# Patient Record
Sex: FEMALE | Race: BLACK OR AFRICAN AMERICAN | NOT HISPANIC OR LATINO | Employment: OTHER | RURAL
[De-identification: names, ages, dates, MRNs, and addresses within clinical notes are randomized per-mention and may not be internally consistent; named-entity substitution may affect disease eponyms.]

---

## 2023-10-20 DIAGNOSIS — M06.9 RHEUMATOID ARTHRITIS: Primary | ICD-10-CM

## 2023-10-20 DIAGNOSIS — I89.0 CHRONIC ACQUIRED LYMPHEDEMA: ICD-10-CM

## 2024-02-06 ENCOUNTER — OFFICE VISIT (OUTPATIENT)
Dept: VASCULAR SURGERY | Facility: CLINIC | Age: 79
End: 2024-02-06
Payer: COMMERCIAL

## 2024-02-06 VITALS
BODY MASS INDEX: 36.68 KG/M2 | HEIGHT: 63 IN | WEIGHT: 207 LBS | HEART RATE: 80 BPM | DIASTOLIC BLOOD PRESSURE: 65 MMHG | SYSTOLIC BLOOD PRESSURE: 132 MMHG | RESPIRATION RATE: 16 BRPM

## 2024-02-06 DIAGNOSIS — M79.605 LEG PAIN, BILATERAL: Primary | ICD-10-CM

## 2024-02-06 DIAGNOSIS — M79.604 LEG PAIN, BILATERAL: Primary | ICD-10-CM

## 2024-02-06 DIAGNOSIS — L81.9 HYPERPIGMENTATION OF SKIN: ICD-10-CM

## 2024-02-06 DIAGNOSIS — L85.9 HYPERKERATOSIS: ICD-10-CM

## 2024-02-06 DIAGNOSIS — R60.0 EDEMA, LOWER EXTREMITY: ICD-10-CM

## 2024-02-06 PROCEDURE — 1160F RVW MEDS BY RX/DR IN RCRD: CPT | Mod: CPTII,,, | Performed by: FAMILY MEDICINE

## 2024-02-06 PROCEDURE — 1159F MED LIST DOCD IN RCRD: CPT | Mod: CPTII,,, | Performed by: FAMILY MEDICINE

## 2024-02-06 PROCEDURE — 3078F DIAST BP <80 MM HG: CPT | Mod: CPTII,,, | Performed by: FAMILY MEDICINE

## 2024-02-06 PROCEDURE — 99205 OFFICE O/P NEW HI 60 MIN: CPT | Mod: PBBFAC | Performed by: FAMILY MEDICINE

## 2024-02-06 PROCEDURE — 99203 OFFICE O/P NEW LOW 30 MIN: CPT | Mod: S$PBB,,, | Performed by: FAMILY MEDICINE

## 2024-02-06 PROCEDURE — 3075F SYST BP GE 130 - 139MM HG: CPT | Mod: CPTII,,, | Performed by: FAMILY MEDICINE

## 2024-02-06 RX ORDER — AMOXICILLIN 250 MG
1 CAPSULE ORAL DAILY
COMMUNITY

## 2024-02-06 RX ORDER — ALBUTEROL SULFATE 0.83 MG/ML
SOLUTION RESPIRATORY (INHALATION)
COMMUNITY
Start: 2024-01-04

## 2024-02-06 RX ORDER — LEVOFLOXACIN 500 MG/1
TABLET, FILM COATED ORAL
COMMUNITY
Start: 2024-01-29

## 2024-02-06 RX ORDER — TRIAMCINOLONE ACETONIDE 1 MG/G
CREAM TOPICAL 2 TIMES DAILY
COMMUNITY

## 2024-02-06 RX ORDER — BACLOFEN 10 MG/1
10 TABLET ORAL 3 TIMES DAILY
COMMUNITY
Start: 2023-10-02

## 2024-02-06 RX ORDER — MUPIROCIN 20 MG/G
OINTMENT TOPICAL 3 TIMES DAILY
COMMUNITY

## 2024-02-06 RX ORDER — LACTULOSE 10 G/15ML
SOLUTION ORAL; RECTAL
COMMUNITY
Start: 2023-12-12

## 2024-02-06 RX ORDER — AZELASTINE 1 MG/ML
SPRAY, METERED NASAL
COMMUNITY
Start: 2024-01-04

## 2024-02-06 RX ORDER — CEPHALEXIN 500 MG/1
CAPSULE ORAL
COMMUNITY
Start: 2024-01-03

## 2024-02-06 RX ORDER — PANTOPRAZOLE SODIUM 40 MG/1
TABLET, DELAYED RELEASE ORAL
COMMUNITY
Start: 2024-01-04

## 2024-02-06 RX ORDER — ONDANSETRON 4 MG/1
4 TABLET, ORALLY DISINTEGRATING ORAL ONCE
COMMUNITY

## 2024-02-06 RX ORDER — ZINC GLUCONATE 50 MG
50 TABLET ORAL DAILY
COMMUNITY

## 2024-02-06 RX ORDER — HYDROCODONE BITARTRATE AND ACETAMINOPHEN 10; 325 MG/1; MG/1
TABLET ORAL
COMMUNITY
Start: 2023-11-06

## 2024-02-06 RX ORDER — POTASSIUM CHLORIDE 750 MG/1
TABLET, EXTENDED RELEASE ORAL
COMMUNITY
Start: 2023-12-18

## 2024-02-06 RX ORDER — LORATADINE 10 MG/1
10 TABLET ORAL DAILY
COMMUNITY

## 2024-02-06 NOTE — PROGRESS NOTES
VEIN CENTER CLINIC NOTE    Patient ID: Ольга Hutchinson is a 79 y.o. female.    I. HISTORY     Chief Complaint:   Chief Complaint   Patient presents with    Leg Swelling     NP; REF BY KLEBER COLINDRES; LEG SWELLING        HPI: Ольга Hutchinson is a 79 y.o. female who is referred here today by Kleber Colindres FNP for evaluation of bilateral lower extremity edema, hyperpigmentation, hyperkeratosis and skin breakdown..  Symptoms are progressive/worsening and began greater than 20 years ago.  Location is bilateral lower extremities below the knees. Symptoms are worse at the end of the day.  History of venous interventions includes none.  Denies family history of venous disease.  Denies history of DVT.  Admits to history of cellulitis.    Venous Disease Medical Necessity Documentation Initial Visit Date:  02/06/2024 Return Check Date:    Have you ever had a rupture or bleed from a varicose vein in your leg(s)?              [] Yes  [x] No   [] Yes   [] No   Have you ever been diagnosed with phlebitis, cellulitis, or inflammation in the area of the varicose veins of  your leg(s)?  [] Yes  [x] No    [] Yes   [] No   Do you have darkened or inflamed skin on your legs?   [x] Yes   [] No   [] Yes   [] No   Do you have leg swelling?     [x] Yes   [] No   [] Yes   [] No   Do you have leg pain?   [x] Yes   [] No   [] Yes   [] No   If yes, describe the type of pain?    []   Stabbing []  Radiating [x]  Aching   []  Tightness []  Throbbing               []  Burning []  Cramping              Do you have leg discomfort?   [x] Yes   [] No   [] Yes   [] No   If yes, describe the type of discomfort?    [x]  Heaviness [x]  Fullness   []  Restlessness [] Tired/Fatigued [] Itching              Have you ever worn compression hose?   [] Yes   [x] No   [] Yes   [] No   If yes, how long?           Do you elevate your legs while sitting?   [x] Yes   [] No   [] Yes   [] No   Does venous disease (varicose veins, ulcers, skin changes, leg pain/swelling)  interfere with your daily life?  If yes, check activities you are limited or unable to do.    []  Shower  []   Walk  []  Exercise  [] Play with children/grandchildren  []  Shop [] Work [] Stand for any period of time [x] Sleep                               [] Sitting for an extended period of time.           [x] Yes   [] No   [] Yes   [] No   Do you exercise/have you tried to exercise (i.e.  Walk our participate in a regular exercise routine)?  [] Yes  [x] No   [] Yes   [] No   BMI 36.67             History reviewed. No pertinent past medical history.     History reviewed. No pertinent surgical history.    Social History     Tobacco Use   Smoking Status Never   Smokeless Tobacco Never         Current Outpatient Medications:     albuterol (PROVENTIL) 2.5 mg /3 mL (0.083 %) nebulizer solution, 3 MILLILITER VIAL NEBULIZED AS NEEDED EVERY 6 HOURS AS NEEDED WHEEZING INHALATION 90 DAYS, Disp: , Rfl:     azelastine (ASTELIN) 137 mcg (0.1 %) nasal spray, 1 PUFF IN EACH NOSTRIL NASALLY TWICE A DAY 90 DAYS, Disp: , Rfl:     baclofen (LIORESAL) 10 MG tablet, Take 10 mg by mouth 3 (three) times daily., Disp: , Rfl:     cephALEXin (KEFLEX) 500 MG capsule, TAKE 1 CAPSULE ORALLY THREE TIMES A DAY X 10 DAYS TAKE WITH A FULL GLASS OF WATER, Disp: , Rfl:     HYDROcodone-acetaminophen (NORCO)  mg per tablet, TAKE 1 ORAL TABLET 3 TIMES A DAY AS NEEDED FOR PAIN DO NOT DRIVE WHILE TAKING THIS MEDICATION TO BE FILLED ON OR AFTER 11-4-2023, Disp: , Rfl:     lactulose (CHRONULAC) 10 gram/15 mL solution, TAKE 10 ML ORALLY EVERY DAY AS NEEDED 90 DAYS, Disp: , Rfl:     levoFLOXacin (LEVAQUIN) 500 MG tablet, TAKE 1 TABLET ORALLY ONCE A DAY 14 DAYS TAKE WITH A FULL GLASS OF WATER, Disp: , Rfl:     loratadine (CLARITIN) 10 mg tablet, Take 10 mg by mouth once daily., Disp: , Rfl:     mupirocin (BACTROBAN) 2 % ointment, Apply topically 3 (three) times daily., Disp: , Rfl:     ondansetron (ZOFRAN-ODT) 4 MG TbDL, Take 4 mg by mouth once., Disp:  , Rfl:     pantoprazole (PROTONIX) 40 MG tablet, 1 TABLET FOR REFLUX ORALLY ONCE A DAY 90 DAYS, Disp: , Rfl:     potassium chloride SA (K-DUR,KLOR-CON M) 10 MEQ tablet, 1 TABLET WITH FOOD ORAL ONCE A DAY 90 DAYS, Disp: , Rfl:     senna-docusate 8.6-50 mg (PERICOLACE) 8.6-50 mg per tablet, Take 1 tablet by mouth once daily., Disp: , Rfl:     triamcinolone acetonide 0.1% (KENALOG) 0.1 % cream, Apply topically 2 (two) times daily., Disp: , Rfl:     zinc gluconate 50 mg tablet, Take 50 mg by mouth once daily., Disp: , Rfl:     Review of Systems   Constitutional:  Negative for activity change, chills, diaphoresis, fatigue and fever.   Respiratory:  Negative for cough and shortness of breath.    Cardiovascular:  Positive for leg swelling. Negative for chest pain and claudication.        Hyperpigmentation LE   Gastrointestinal:  Negative for nausea and vomiting.   Musculoskeletal:  Positive for leg pain. Negative for joint swelling.   Integumentary:  Negative for rash and wound.   Neurological:  Negative for weakness and numbness.          II. PHYSICAL EXAM     Physical Exam  Constitutional:       General: She is awake. She is not in acute distress.     Appearance: Normal appearance. She is obese. She is not ill-appearing or toxic-appearing.   HENT:      Head: Normocephalic and atraumatic.   Eyes:      Extraocular Movements: Extraocular movements intact.      Conjunctiva/sclera: Conjunctivae normal.      Pupils: Pupils are equal, round, and reactive to light.   Neck:      Vascular: No carotid bruit or JVD.   Cardiovascular:      Rate and Rhythm: Normal rate and regular rhythm.      Pulses:           Dorsalis pedis pulses are detected w/ Doppler on the left side.      Heart sounds: No murmur heard.  Pulmonary:      Effort: Pulmonary effort is normal. No respiratory distress.      Breath sounds: No stridor. No wheezing, rhonchi or rales.   Musculoskeletal:         General: No swelling, tenderness or deformity.      Right  lower leg: 3+ Pitting Edema present.      Left lower leg: 3+ Pitting Edema present.      Comments: Massive edema of the bilateral lower extremities with hyperkeratosis with plaques and some superficial skin breakdown.  No signs of active cellulitis noted today.   Feet:      Comments: Triphasic left dorsalis pedis pulse detected with hand-held Doppler.  Other pulses undetectable secondary to hyperkeratosis plaques.  Skin:     General: Skin is warm.      Capillary Refill: Capillary refill takes less than 2 seconds.      Coloration: Skin is not ashen.      Findings: No bruising, erythema, lesion, rash or wound.   Neurological:      Mental Status: She is alert and oriented to person, place, and time.      Motor: No weakness.   Psychiatric:         Speech: Speech normal.         Behavior: Behavior normal. Behavior is cooperative.         Reticular/Spider veins noted:  RLE: none  LLE: none    Varicose veins noted:  RLE: none  LLE:  none    CEAP Classification                   Venous Clinical Severity Score     III. ASSESSMENT & PLAN (MEDICAL DECISION MAKING)     1. Leg pain, bilateral    2. Hyperpigmentation of skin    3. Edema, lower extremity    4. Hyperkeratosis        Assessment/Diagnosis and Plan:  Patient has complaints, symptoms and physical exam findings of chronic venous disease.  Therefore, I will order a bilateral complete venous reflux study and see the patient back with results.    Today, we will wrap the patient with pink unna boot and Coban 2 Lite wraps bilaterally.  I have written home health orders for twice weekly wrap changes to this effect.  Patient will need to be wrap from the toes to the catch of the knee.    Orders Placed This Encounter    US Venous Reflux Study Bilateral          Shabbir León DO

## 2024-02-13 ENCOUNTER — HOSPITAL ENCOUNTER (OUTPATIENT)
Dept: RADIOLOGY | Facility: HOSPITAL | Age: 79
Discharge: HOME | End: 2024-02-13
Attending: FAMILY MEDICINE
Payer: COMMERCIAL

## 2024-02-13 ENCOUNTER — OFFICE VISIT (OUTPATIENT)
Dept: VASCULAR SURGERY | Facility: CLINIC | Age: 79
End: 2024-02-13
Payer: COMMERCIAL

## 2024-02-13 VITALS
HEART RATE: 80 BPM | HEIGHT: 63 IN | DIASTOLIC BLOOD PRESSURE: 68 MMHG | WEIGHT: 207 LBS | SYSTOLIC BLOOD PRESSURE: 118 MMHG | BODY MASS INDEX: 36.68 KG/M2 | RESPIRATION RATE: 16 BRPM

## 2024-02-13 DIAGNOSIS — M79.605 LEG PAIN, BILATERAL: ICD-10-CM

## 2024-02-13 DIAGNOSIS — M79.604 LEG PAIN, BILATERAL: ICD-10-CM

## 2024-02-13 DIAGNOSIS — L85.9 HYPERKERATOSIS: ICD-10-CM

## 2024-02-13 DIAGNOSIS — I87.2 VENOUS INSUFFICIENCY: Primary | ICD-10-CM

## 2024-02-13 DIAGNOSIS — R60.0 EDEMA, LOWER EXTREMITY: ICD-10-CM

## 2024-02-13 DIAGNOSIS — L81.9 HYPERPIGMENTATION OF SKIN: ICD-10-CM

## 2024-02-13 PROCEDURE — 3078F DIAST BP <80 MM HG: CPT | Mod: CPTII,,, | Performed by: FAMILY MEDICINE

## 2024-02-13 PROCEDURE — 99214 OFFICE O/P EST MOD 30 MIN: CPT | Mod: PBBFAC,25 | Performed by: FAMILY MEDICINE

## 2024-02-13 PROCEDURE — 93970 EXTREMITY STUDY: CPT | Mod: TC

## 2024-02-13 PROCEDURE — 1159F MED LIST DOCD IN RCRD: CPT | Mod: CPTII,,, | Performed by: FAMILY MEDICINE

## 2024-02-13 PROCEDURE — 1160F RVW MEDS BY RX/DR IN RCRD: CPT | Mod: CPTII,,, | Performed by: FAMILY MEDICINE

## 2024-02-13 PROCEDURE — 99214 OFFICE O/P EST MOD 30 MIN: CPT | Mod: S$PBB,,, | Performed by: FAMILY MEDICINE

## 2024-02-13 PROCEDURE — 93970 EXTREMITY STUDY: CPT | Mod: 26,,, | Performed by: FAMILY MEDICINE

## 2024-02-13 PROCEDURE — 3074F SYST BP LT 130 MM HG: CPT | Mod: CPTII,,, | Performed by: FAMILY MEDICINE

## 2024-02-13 NOTE — PROGRESS NOTES
VEIN CENTER CLINIC NOTE    Patient ID: Ольга Hutchinson is a 79 y.o. female.    I. HISTORY     Chief Complaint:   Chief Complaint   Patient presents with    Follow-up     US RESULTS BAILEY COMP        HPI: Ольга Hutchinson is a 79 y.o. female who presents for follow-up and results to a bilateral complete venous reflux study, today 02/13/2024, and the results were discussed with the patient.  This study shows no evidence of DVT bilaterally, however study limited to body habitus.  The study also shows dilation and axial reflux of the bilateral great saphenous veins.  No reflux noted in the bilateral small saphenous veins.    Patient was initially seen on 02/06/2024 by referral from Ank[facility] Colindres [facility] for evaluation of bilateral lower extremity edema, hyperpigmentation, hyperkeratosis and skin breakdown..  Symptoms are progressive/worsening and began greater than 20 years ago.  Location is bilateral lower extremities below the knees. Symptoms are worse at the end of the day.  History of venous interventions includes none.  Denies family history of venous disease.  Denies history of DVT.  Admits to history of cellulitis.    Venous Disease Medical Necessity Documentation Initial Visit Date:  02/06/2024 Return Check Date:    Have you ever had a rupture or bleed from a varicose vein in your leg(s)?              [] Yes  [x] No   [] Yes   [] No   Have you ever been diagnosed with phlebitis, cellulitis, or inflammation in the area of the varicose veins of  your leg(s)?  [] Yes  [x] No    [] Yes   [] No   Do you have darkened or inflamed skin on your legs?   [x] Yes   [] No   [] Yes   [] No   Do you have leg swelling?     [x] Yes   [] No   [] Yes   [] No   Do you have leg pain?   [x] Yes   [] No   [] Yes   [] No   If yes, describe the type of pain?    []   Stabbing []  Radiating [x]  Aching   []  Tightness []  Throbbing               []  Burning []  Cramping              Do you have leg discomfort?   [x] Yes   [] No   [] Yes   [] No   If  yes, describe the type of discomfort?    [x]  Heaviness [x]  Fullness   []  Restlessness [] Tired/Fatigued [] Itching              Have you ever worn compression hose?   [] Yes   [x] No   [] Yes   [] No   If yes, how long?           Do you elevate your legs while sitting?   [x] Yes   [] No   [] Yes   [] No   Does venous disease (varicose veins, ulcers, skin changes, leg pain/swelling) interfere with your daily life?  If yes, check activities you are limited or unable to do.    []  Shower  []   Walk  []  Exercise  [] Play with children/grandchildren  []  Shop [] Work [] Stand for any period of time [x] Sleep                               [] Sitting for an extended period of time.           [x] Yes   [] No   [] Yes   [] No   Do you exercise/have you tried to exercise (i.e.  Walk our participate in a regular exercise routine)?  [] Yes  [x] No   [] Yes   [] No   BMI 36.67             History reviewed. No pertinent past medical history.     History reviewed. No pertinent surgical history.    Social History     Tobacco Use   Smoking Status Never   Smokeless Tobacco Never         Current Outpatient Medications:     albuterol (PROVENTIL) 2.5 mg /3 mL (0.083 %) nebulizer solution, 3 MILLILITER VIAL NEBULIZED AS NEEDED EVERY 6 HOURS AS NEEDED WHEEZING INHALATION 90 DAYS, Disp: , Rfl:     azelastine (ASTELIN) 137 mcg (0.1 %) nasal spray, 1 PUFF IN EACH NOSTRIL NASALLY TWICE A DAY 90 DAYS, Disp: , Rfl:     baclofen (LIORESAL) 10 MG tablet, Take 10 mg by mouth 3 (three) times daily., Disp: , Rfl:     cephALEXin (KEFLEX) 500 MG capsule, TAKE 1 CAPSULE ORALLY THREE TIMES A DAY X 10 DAYS TAKE WITH A FULL GLASS OF WATER, Disp: , Rfl:     HYDROcodone-acetaminophen (NORCO)  mg per tablet, TAKE 1 ORAL TABLET 3 TIMES A DAY AS NEEDED FOR PAIN DO NOT DRIVE WHILE TAKING THIS MEDICATION TO BE FILLED ON OR AFTER 11-4-2023, Disp: , Rfl:     lactulose (CHRONULAC) 10 gram/15 mL solution, TAKE 10 ML ORALLY EVERY DAY AS NEEDED 90 DAYS,  Disp: , Rfl:     levoFLOXacin (LEVAQUIN) 500 MG tablet, TAKE 1 TABLET ORALLY ONCE A DAY 14 DAYS TAKE WITH A FULL GLASS OF WATER, Disp: , Rfl:     loratadine (CLARITIN) 10 mg tablet, Take 10 mg by mouth once daily., Disp: , Rfl:     mupirocin (BACTROBAN) 2 % ointment, Apply topically 3 (three) times daily., Disp: , Rfl:     ondansetron (ZOFRAN-ODT) 4 MG TbDL, Take 4 mg by mouth once., Disp: , Rfl:     pantoprazole (PROTONIX) 40 MG tablet, 1 TABLET FOR REFLUX ORALLY ONCE A DAY 90 DAYS, Disp: , Rfl:     potassium chloride SA (K-DUR,KLOR-CON M) 10 MEQ tablet, 1 TABLET WITH FOOD ORAL ONCE A DAY 90 DAYS, Disp: , Rfl:     senna-docusate 8.6-50 mg (PERICOLACE) 8.6-50 mg per tablet, Take 1 tablet by mouth once daily., Disp: , Rfl:     triamcinolone acetonide 0.1% (KENALOG) 0.1 % cream, Apply topically 2 (two) times daily., Disp: , Rfl:     zinc gluconate 50 mg tablet, Take 50 mg by mouth once daily., Disp: , Rfl:     Review of Systems   Constitutional:  Negative for activity change, chills, diaphoresis, fatigue and fever.   Respiratory:  Negative for cough and shortness of breath.    Cardiovascular:  Positive for leg swelling. Negative for chest pain and claudication.        Hyperpigmentation LE   Gastrointestinal:  Negative for nausea and vomiting.   Musculoskeletal:  Positive for leg pain. Negative for joint swelling.   Integumentary:  Negative for rash and wound.   Neurological:  Negative for weakness and numbness.          II. PHYSICAL EXAM     Physical Exam  Constitutional:       General: She is awake. She is not in acute distress.     Appearance: Normal appearance. She is obese. She is not ill-appearing or toxic-appearing.   HENT:      Head: Normocephalic and atraumatic.   Eyes:      Extraocular Movements: Extraocular movements intact.      Conjunctiva/sclera: Conjunctivae normal.      Pupils: Pupils are equal, round, and reactive to light.   Neck:      Vascular: No carotid bruit or JVD.   Cardiovascular:      Rate and  Rhythm: Normal rate and regular rhythm.      Pulses:           Dorsalis pedis pulses are detected w/ Doppler on the left side.      Heart sounds: No murmur heard.  Pulmonary:      Effort: Pulmonary effort is normal. No respiratory distress.      Breath sounds: No stridor. No wheezing, rhonchi or rales.   Musculoskeletal:         General: No swelling, tenderness or deformity.      Right lower leg: 3+ Pitting Edema present.      Left lower leg: 3+ Pitting Edema present.      Comments: Massive edema of the bilateral lower extremities with hyperkeratosis with plaques and some superficial skin breakdown.  No signs of active cellulitis noted today.   Feet:      Comments: Triphasic left dorsalis pedis pulse detected with hand-held Doppler.  Other pulses undetectable secondary to hyperkeratosis plaques.  Skin:     General: Skin is warm.      Capillary Refill: Capillary refill takes less than 2 seconds.      Coloration: Skin is not ashen.      Findings: No bruising, erythema, lesion, rash or wound.   Neurological:      Mental Status: She is alert and oriented to person, place, and time.      Motor: No weakness.   Psychiatric:         Speech: Speech normal.         Behavior: Behavior normal. Behavior is cooperative.         Reticular/Spider veins noted:  RLE: none  LLE: none    Varicose veins noted:  RLE: none  LLE:  none    CEAP Classification  Clinical Signs: Class 4 - Skin changes ascribed to venous disease  Etiologic Classification: Primary  Anatomic distribution: Superficial  Pathophysiologic dysfunction: Reflux                     Venous Clinical Severity Score  Pain:2=Daily, moderate activity limitation, occasional analgesics  Varicose Veins: 0=None  Venous Edema: 3=Morning edema above ankle and requiring activity change, elevation  Pigmentation: 3=Wider distribution (above lower 1/3) and recent pigmentation  Inflammation: 0=None  Induration: 3=Entire lower third of leg or more  Number of Active Ulcers: 2=2  Active  Ulceration, Duration: 2=>3 months, <1 year  Active Ulcer Size: 1=<2 cm diameter  Compressive Therapy: 3=Full compliance, stockings + elevation  Total Score: 19       III. ASSESSMENT & PLAN (MEDICAL DECISION MAKING)     1. Venous insufficiency    2. Hyperpigmentation of skin    3. Leg pain, bilateral    4. Edema, lower extremity    5. Hyperkeratosis        Assessment/Diagnosis and Plan:  Ultrasound of lower extremities reveals positive evidence of venous insufficiency in the bilateral great saphenous veins.  Plan for conservative medical treatment at this time. The patient may benefit from endovenous ablation in the future.     - continue with pink unna boot and Coban 2 Lite wraps with twice weekly wrap changes by home health.  - Therapeutic leg elevation  - Calf pumping exercises  - RTC 1 months for further evaluation.  Patient contemplating correctional procedures at this time.            Shabbir León, DO

## 2024-03-25 ENCOUNTER — OFFICE VISIT (OUTPATIENT)
Dept: VASCULAR SURGERY | Facility: CLINIC | Age: 79
End: 2024-03-25
Payer: COMMERCIAL

## 2024-03-25 VITALS
HEIGHT: 63 IN | DIASTOLIC BLOOD PRESSURE: 70 MMHG | HEART RATE: 88 BPM | BODY MASS INDEX: 36.68 KG/M2 | SYSTOLIC BLOOD PRESSURE: 134 MMHG | WEIGHT: 207 LBS | RESPIRATION RATE: 20 BRPM

## 2024-03-25 DIAGNOSIS — L85.9 HYPERKERATOSIS: ICD-10-CM

## 2024-03-25 DIAGNOSIS — I87.2 VENOUS INSUFFICIENCY: ICD-10-CM

## 2024-03-25 DIAGNOSIS — M79.605 LEG PAIN, BILATERAL: ICD-10-CM

## 2024-03-25 DIAGNOSIS — I87.2 VENOUS INSUFFICIENCY: Primary | ICD-10-CM

## 2024-03-25 DIAGNOSIS — R60.0 EDEMA, LOWER EXTREMITY: ICD-10-CM

## 2024-03-25 DIAGNOSIS — L81.9 HYPERPIGMENTATION OF SKIN: Primary | ICD-10-CM

## 2024-03-25 DIAGNOSIS — M79.604 LEG PAIN, BILATERAL: ICD-10-CM

## 2024-03-25 PROCEDURE — 99213 OFFICE O/P EST LOW 20 MIN: CPT | Mod: S$PBB,,, | Performed by: FAMILY MEDICINE

## 2024-03-25 PROCEDURE — 1159F MED LIST DOCD IN RCRD: CPT | Mod: CPTII,,, | Performed by: FAMILY MEDICINE

## 2024-03-25 PROCEDURE — 3075F SYST BP GE 130 - 139MM HG: CPT | Mod: CPTII,,, | Performed by: FAMILY MEDICINE

## 2024-03-25 PROCEDURE — 1160F RVW MEDS BY RX/DR IN RCRD: CPT | Mod: CPTII,,, | Performed by: FAMILY MEDICINE

## 2024-03-25 PROCEDURE — 3078F DIAST BP <80 MM HG: CPT | Mod: CPTII,,, | Performed by: FAMILY MEDICINE

## 2024-03-25 PROCEDURE — 99214 OFFICE O/P EST MOD 30 MIN: CPT | Mod: PBBFAC | Performed by: FAMILY MEDICINE

## 2024-03-25 NOTE — PATIENT INSTRUCTIONS
Pre Procedure Information    Procedure (s) and Date (s): 1. Right GSV V enaSeal Ablation 05/16/2024 @ 0830               2. Left GSV VenaSeal Ablation 05/23/2024 @ 0830    We will call you the day prior with your time to report for surgery.  You will check in at [facility].  Shower prior to procedure as your leg will be wrapped for 48 hours and you will not be able to shower.  Do not wear lotion, oil, or cream on you legs on the day of your procedure.  This will cause the Doctor's nickolas to fade.  Wear shorts, skirt, or loose pants and larger shoes (house shoes).   Bring a pillow or two and leave in the car (to prop your leg).  Prepare to walk 15 minutes out of each hour for the first 48 hours post op.  Prepare to keep your legs propped up while sitting for the first 48 hours or 2 days following your procedure (recliner, couch, or chair).  Dressings will remain on your legs for at least 48 hours or 2 days after procedure.  Full discharge instructions will be provided on the day of your procedure.    Typically, you are in and out within a few hours.  We will follow you postoperatively with a 4 day post ablation ultrasound and then a 1 month, 3 month, 6 month, and 1 year post ablation visit with the physician or nurse practitioner with or without an ultrasound.  Please make every effort to attend these appointments as they will be essential to following you progress.  Please kindly notify us as soon as possible if you need to reschedule your appointment.  As always, we look forward to caring for you and are happy to answer your questions.  Please call us at [phone].

## 2024-03-25 NOTE — PROGRESS NOTES
VEIN CENTER CLINIC NOTE    Patient ID: Оьлга Hutchinson is a 78 y.o. female.    I. HISTORY     Chief Complaint:   Chief Complaint   Patient presents with    Follow-up     Procedure room 1. 1M WRAP        HPI: Ольга Hutchinson is a 78 y.o. female who presents for follow-up  And wrap/ wound check.  The patient has been undergoing pink unna boot and Coban 2 Lite compression wraps with twice weekly wrap changes by home health.  Patient was noted to have superficial skin breakdown bilaterally along with hyperkeratosis and brawny edema.  The patient states that the wraps have helped to improve the condition of her lower extremities with less edema and pain.   However, she continues to have life altering symptoms as well as skin breakdown of the bilateral lower extremities and would like to have underlying condition corrected if possible.    Bilateral complete venous reflux study, 02/13/2024, shows no evidence of DVT bilaterally, however study limited to body habitus.  The study also shows dilation and axial reflux of the bilateral great saphenous veins.  No reflux noted in the bilateral small saphenous veins.    Patient was initially seen on 02/06/2024 by referral from [facility] for evaluation of bilateral lower extremity edema, hyperpigmentation, hyperkeratosis and skin breakdown..  Symptoms are progressive/worsening and began greater than 20 years ago.  Location is bilateral lower extremities below the knees. Symptoms are worse at the end of the day.  History of venous interventions includes none.  Denies family history of venous disease.  Denies history of DVT.  Admits to history of cellulitis.    Venous Disease Medical Necessity Documentation Initial Visit Date:  02/06/2024 Return Check Date:    Have you ever had a rupture or bleed from a varicose vein in your leg(s)?              [] Yes  [x] No   [] Yes   [] No   Have you ever been diagnosed with phlebitis, cellulitis, or inflammation in the area of the varicose veins  of  your leg(s)?  [] Yes  [x] No    [] Yes   [] No   Do you have darkened or inflamed skin on your legs?   [x] Yes   [] No   [] Yes   [] No   Do you have leg swelling?     [x] Yes   [] No   [] Yes   [] No   Do you have leg pain?   [x] Yes   [] No   [] Yes   [] No   If yes, describe the type of pain?    []   Stabbing []  Radiating [x]  Aching   []  Tightness []  Throbbing               []  Burning []  Cramping              Do you have leg discomfort?   [x] Yes   [] No   [] Yes   [] No   If yes, describe the type of discomfort?    [x]  Heaviness [x]  Fullness   []  Restlessness [] Tired/Fatigued [] Itching              Have you ever worn compression hose?   [] Yes   [x] No   [] Yes   [] No   If yes, how long?           Do you elevate your legs while sitting?   [x] Yes   [] No   [] Yes   [] No   Does venous disease (varicose veins, ulcers, skin changes, leg pain/swelling) interfere with your daily life?  If yes, check activities you are limited or unable to do.    []  Shower  []   Walk  []  Exercise  [] Play with children/grandchildren  []  Shop [] Work [] Stand for any period of time [x] Sleep                               [] Sitting for an extended period of time.           [x] Yes   [] No   [] Yes   [] No   Do you exercise/have you tried to exercise (i.e.  Walk our participate in a regular exercise routine)?  [] Yes  [x] No   [] Yes   [] No   BMI 36.67             History reviewed. No pertinent past medical history.     History reviewed. No pertinent surgical history.    Social History     Tobacco Use   Smoking Status Never   Smokeless Tobacco Never         Current Outpatient Medications:     albuterol (PROVENTIL) 2.5 mg /3 mL (0.083 %) nebulizer solution, 3 MILLILITER VIAL NEBULIZED AS NEEDED EVERY 6 HOURS AS NEEDED WHEEZING INHALATION 90 DAYS, Disp: , Rfl:     azelastine (ASTELIN) 137 mcg (0.1 %) nasal spray, 1 PUFF IN EACH NOSTRIL NASALLY TWICE A DAY 90 DAYS, Disp: , Rfl:     baclofen (LIORESAL) 10 MG tablet,  Take 10 mg by mouth 3 (three) times daily., Disp: , Rfl:     cephALEXin (KEFLEX) 500 MG capsule, TAKE 1 CAPSULE ORALLY THREE TIMES A DAY X 10 DAYS TAKE WITH A FULL GLASS OF WATER, Disp: , Rfl:     HYDROcodone-acetaminophen (NORCO)  mg per tablet, TAKE 1 ORAL TABLET 3 TIMES A DAY AS NEEDED FOR PAIN DO NOT DRIVE WHILE TAKING THIS MEDICATION TO BE FILLED ON OR AFTER 11-4-2023, Disp: , Rfl:     lactulose (CHRONULAC) 10 gram/15 mL solution, TAKE 10 ML ORALLY EVERY DAY AS NEEDED 90 DAYS, Disp: , Rfl:     levoFLOXacin (LEVAQUIN) 500 MG tablet, TAKE 1 TABLET ORALLY ONCE A DAY 14 DAYS TAKE WITH A FULL GLASS OF WATER, Disp: , Rfl:     loratadine (CLARITIN) 10 mg tablet, Take 10 mg by mouth once daily., Disp: , Rfl:     mupirocin (BACTROBAN) 2 % ointment, Apply topically 3 (three) times daily., Disp: , Rfl:     ondansetron (ZOFRAN-ODT) 4 MG TbDL, Take 4 mg by mouth once., Disp: , Rfl:     pantoprazole (PROTONIX) 40 MG tablet, 1 TABLET FOR REFLUX ORALLY ONCE A DAY 90 DAYS, Disp: , Rfl:     potassium chloride SA (K-DUR,KLOR-CON M) 10 MEQ tablet, 1 TABLET WITH FOOD ORAL ONCE A DAY 90 DAYS, Disp: , Rfl:     senna-docusate 8.6-50 mg (PERICOLACE) 8.6-50 mg per tablet, Take 1 tablet by mouth once daily., Disp: , Rfl:     triamcinolone acetonide 0.1% (KENALOG) 0.1 % cream, Apply topically 2 (two) times daily., Disp: , Rfl:     zinc gluconate 50 mg tablet, Take 50 mg by mouth once daily., Disp: , Rfl:     Review of Systems   Constitutional:  Negative for activity change, chills, diaphoresis, fatigue and fever.   Respiratory:  Negative for cough and shortness of breath.    Cardiovascular:  Positive for leg swelling. Negative for chest pain and claudication.        Hyperpigmentation LE   Gastrointestinal:  Negative for nausea and vomiting.   Musculoskeletal:  Positive for leg pain. Negative for joint swelling.   Integumentary:  Negative for rash and wound.   Neurological:  Negative for weakness and numbness.          II. PHYSICAL  EXAM     Physical Exam  Constitutional:       General: She is awake. She is not in acute distress.     Appearance: Normal appearance. She is obese. She is not ill-appearing or toxic-appearing.   HENT:      Head: Normocephalic and atraumatic.   Eyes:      Extraocular Movements: Extraocular movements intact.      Conjunctiva/sclera: Conjunctivae normal.      Pupils: Pupils are equal, round, and reactive to light.   Neck:      Vascular: No carotid bruit or JVD.   Cardiovascular:      Rate and Rhythm: Normal rate and regular rhythm.      Pulses:           Dorsalis pedis pulses are detected w/ Doppler on the left side.      Heart sounds: No murmur heard.  Pulmonary:      Effort: Pulmonary effort is normal. No respiratory distress.      Breath sounds: No stridor. No wheezing, rhonchi or rales.   Musculoskeletal:         General: No swelling, tenderness or deformity.      Right lower leg: 3+ Pitting Edema present.      Left lower leg: 3+ Pitting Edema present.      Comments: Massive edema of the bilateral lower extremities with hyperkeratosis with plaques and some superficial skin breakdown.  No signs of active cellulitis noted today.   Feet:      Comments: Triphasic left dorsalis pedis pulse detected with hand-held Doppler.  Other pulses undetectable secondary to hyperkeratosis plaques.  Skin:     General: Skin is warm.      Capillary Refill: Capillary refill takes less than 2 seconds.      Coloration: Skin is not ashen.      Findings: No bruising, erythema, lesion, rash or wound.   Neurological:      Mental Status: She is alert and oriented to person, place, and time.      Motor: No weakness.   Psychiatric:         Speech: Speech normal.         Behavior: Behavior normal. Behavior is cooperative.         Reticular/Spider veins noted:  RLE: none  LLE: none    Varicose veins noted:  RLE: none  LLE:  none    CEAP Classification  Clinical Signs: Class 6 - Leg ulceration, skin changes as defined above  Etiologic  Classification: Primary  Anatomic distribution: Superficial  Pathophysiologic dysfunction: Reflux                     Venous Clinical Severity Score  Pain:2=Daily, moderate activity limitation, occasional analgesics  Varicose Veins: 0=None  Venous Edema: 3=Morning edema above ankle and requiring activity change, elevation  Pigmentation: 3=Wider distribution (above lower 1/3) and recent pigmentation  Inflammation: 0=None  Induration: 3=Entire lower third of leg or more  Number of Active Ulcers: 2=2  Active Ulceration, Duration: 2=>3 months, <1 year  Active Ulcer Size: 1=<2 cm diameter  Compressive Therapy: 3=Full compliance, stockings + elevation  Total Score: 19       III. ASSESSMENT & PLAN (MEDICAL DECISION MAKING)     1. Hyperpigmentation of skin    2. Venous insufficiency    3. Leg pain, bilateral    4. Hyperkeratosis    5. Edema, lower extremity        Assessment/Diagnosis and Plan:    - continue with pink unna boot and Coban 2 Lite wraps with twice weekly wrap changes by home health.  - Therapeutic leg elevation  - Calf pumping exercises    - The patient continues to have sequela of chronic venous insufficiency despite over 3 months of conservative therapy. The patient continues to have life altering symptoms as well as a positive reflux study as noted in the history of present illness above. At this time I believe it would be reasonable to proceed with a bilateral great saphenous vein  non thermal adhesive endovenous ablation for symptomatic venous insufficiency.  Untreated venous disease increases the patient's risk for cellulitis, deep vein thrombosis, chronic skin changes and venous ulceration.  Risk and benefits of the procedure were explained to the patient and the patient wishes to proceed. The patient does not have overly distended veins and denies history of DVT/PE and will not require prophylactic anticoagulation.          Shabbir León,

## 2024-05-23 ENCOUNTER — PROCEDURE VISIT (OUTPATIENT)
Dept: VASCULAR SURGERY | Facility: CLINIC | Age: 79
End: 2024-05-23
Payer: COMMERCIAL

## 2024-05-23 VITALS
WEIGHT: 207 LBS | HEART RATE: 75 BPM | HEIGHT: 65 IN | DIASTOLIC BLOOD PRESSURE: 94 MMHG | RESPIRATION RATE: 16 BRPM | SYSTOLIC BLOOD PRESSURE: 144 MMHG | BODY MASS INDEX: 34.49 KG/M2

## 2024-05-23 DIAGNOSIS — L81.9 HYPERPIGMENTATION OF SKIN: ICD-10-CM

## 2024-05-23 DIAGNOSIS — I87.2 VENOUS INSUFFICIENCY: Primary | ICD-10-CM

## 2024-05-23 DIAGNOSIS — M79.605 LEG PAIN, BILATERAL: ICD-10-CM

## 2024-05-23 DIAGNOSIS — R60.0 EDEMA, LOWER EXTREMITY: ICD-10-CM

## 2024-05-23 DIAGNOSIS — L85.9 HYPERKERATOSIS: ICD-10-CM

## 2024-05-23 DIAGNOSIS — M79.604 LEG PAIN, BILATERAL: ICD-10-CM

## 2024-05-23 PROCEDURE — 99999PBSHW PR PBB SHADOW TECHNICAL ONLY FILED TO HB: Mod: PBBFAC,,,

## 2024-05-23 PROCEDURE — 36482 ENDOVEN THER CHEM ADHES 1ST: CPT | Mod: S$PBB,RT,, | Performed by: FAMILY MEDICINE

## 2024-05-23 PROCEDURE — 36482 ENDOVEN THER CHEM ADHES 1ST: CPT | Mod: PBBFAC | Performed by: FAMILY MEDICINE

## 2024-05-23 PROCEDURE — C1888 ENDOVAS NON-CARDIAC ABL CATH: HCPCS

## 2024-05-23 PROCEDURE — 27000272 HC BANDAGE KERLIX

## 2024-05-23 PROCEDURE — 27000932 HC BANDAGE ELAST SELF CLOSURE

## 2024-05-23 RX ORDER — LIDOCAINE HYDROCHLORIDE 10 MG/ML
1.5 INJECTION INFILTRATION; PERINEURAL ONCE
Status: COMPLETED | OUTPATIENT
Start: 2024-05-23 | End: 2024-05-23

## 2024-05-23 RX ADMIN — LIDOCAINE HYDROCHLORIDE 1.5 ML: 10 INJECTION, SOLUTION INFILTRATION; PERINEURAL at 08:05

## 2024-05-23 NOTE — PATIENT INSTRUCTIONS
Vein Procedure Discharge Instructions and appointment: Thursday 05/30/2024 @ 8:30 a.m.    For the first 48 hours (2 days) following your procedure:  Walk 15 minutes on every hour (while awake). Continue calf exercises and ankle flexing.  Keep leg propped up (recliner, couch, etc.) while sitting.  Take pain medication, if prescribed.  Take Ibuprofen, or Acetaminophen, for the next 2-3 days with an over-the-counter anti-acid (Prilosec, Protonix, Nexium, etc.)    After your 48 hours (2 days) Post Op period complete:  Remove dressings, throw away all except the ACE wrap, keep the ACE wrap. To Be done by Home Health  Shower using warm soapy water.  Use separate wash cloth on the treated leg.  No baths for 2 weeks.  Compression up to groin must be worn daily for 2 weeks.  You may use the ACE wrap for entire leg, or compression hose to knees and ACE wrap to groin.       Activity:  If you have a normal ultrasound one week after your ablation:  You may resume walking activities immediately.  You may resume jogging 2 weeks after your procedure.  You may resume swimming 2 weeks after your procedure.  If you had microphlebectomies, you must make sure all areas are completely healed before swimming.  You may resume lower body weight lifting 2 weeks after your procedure.  You may resume upper body weight lifting while sitting down 2 days after your procedure.  You may resume sexual activities 2 weeks after your procedure.  You may fly commercially 2 weeks after your procedure.  You may scuba dive 1 month after your procedure.    Pilots - You may not fly for 1 month after your procedure.  You must have a normal 1 month post op ultrasound before returning to flight status.     Pre Procedure Information    Procedure (s) and Date (s): 1. LT GSV VenaSeal Ablation 05/30/2024 @ 8:30 a.m.      We will call you the day prior with your time to report for surgery.  You will check in at [facility].  Shower prior to procedure as  your leg will be wrapped for 48 hours and you will not be able to shower.  Do not wear lotion, oil, or cream on you legs on the day of your procedure.  This will cause the Doctor's nickolas to fade.  Wear shorts, skirt, or loose pants and larger shoes (house shoes).   Bring a pillow or two and leave in the car (to prop your leg).  Prepare to walk 15 minutes out of each hour for the first 48 hours post op.  Prepare to keep your legs propped up while sitting for the first 48 hours or 2 days following your procedure (recliner, couch, or chair).  Dressings will remain on your legs for at least 48 hours or 2 days after procedure.  Full discharge instructions will be provided on the day of your procedure.    Typically, you are in and out within a few hours.  We will follow you postoperatively with a 4 day post ablation ultrasound and then a 1 month, 3 month, 6 month, and 1 year post ablation visit with the physician or nurse practitioner with or without an ultrasound.  Please make every effort to attend these appointments as they will be essential to following you progress.  Please kindly notify us as soon as possible if you need to reschedule your appointment.  As always, we look forward to caring for you and are happy to answer your questions.  Please call us at [phone].

## 2024-05-23 NOTE — PROCEDURES
Date: 5/23/24   Surgeon: Dr Corbin León DO    Procedure: Endovenous Adhesive Ablation of the   Right Greater Saphenous Vein     Estimated blood loss: 3 cc.  Specimens removed:  None.  Complications:  None.  Implants or grafts:  None.    Findings: Treatment Length  53 (cm):  maximum diameter of the vein treated 7.0 mm with a maximum reflux time of 3.0 seconds.    Pre-Procedure: Patient's vital signs and informed consent were obtained. Patient history was checked and updated with any changes since the last visit. The patient continues to present with symptoms of venous disease as proven via DUS Venous Insufficiency exam completed prior to procedure. A complete Time Out was performed; with all parties present, which verified patient's identification, intended procedure, correct procedure site, and all equipment/supplies needed to complete the procedure.     Procedure: Ultrasound guidance was used to nickolas the target vein with the patient positioned on the treatment table. The entire lower extremity was prepped with a 50/50 % solution of isopropyl alcohol and chlorhexidine; then sterile drapes were applied. Once the desired access point was identified; less than 5mL of 0.5% Lidocaine buffered with Sodium Bicarbonate was distributed, to comfortably gain access in real-time with ultrasound guidance. A guidewire was used as necessary, which allowed insertion of the blue introduction catheter. Once positioned, the delivery catheter was prepped and inserted into the introducer sheath. The delivery catheter position was confirmed via ultrasound 5cm distal to Saphenofemoral junction. Following the IFU, adhesive was delivered, segmentally, into the target vein. Ultrasound visualization confirmed successful treatment of the targeted vein with no evidence of complications at the junction. All devices were then removed, and hemostasis was achieved with a suture. Dressings with compression were applied to the access site and to  any puncture sites requiring them. There was minimal blood loss.

## 2024-05-24 DIAGNOSIS — I87.2 VENOUS INSUFFICIENCY: Primary | ICD-10-CM

## 2024-05-30 ENCOUNTER — HOSPITAL ENCOUNTER (OUTPATIENT)
Dept: RADIOLOGY | Facility: HOSPITAL | Age: 79
Discharge: HOME | End: 2024-05-30
Attending: FAMILY MEDICINE
Payer: COMMERCIAL

## 2024-05-30 ENCOUNTER — PROCEDURE VISIT (OUTPATIENT)
Dept: VASCULAR SURGERY | Facility: CLINIC | Age: 79
End: 2024-05-30
Payer: COMMERCIAL

## 2024-05-30 VITALS
RESPIRATION RATE: 18 BRPM | WEIGHT: 207 LBS | DIASTOLIC BLOOD PRESSURE: 82 MMHG | HEIGHT: 65 IN | BODY MASS INDEX: 34.49 KG/M2 | SYSTOLIC BLOOD PRESSURE: 142 MMHG | HEART RATE: 96 BPM

## 2024-05-30 DIAGNOSIS — I87.2 VENOUS INSUFFICIENCY: ICD-10-CM

## 2024-05-30 DIAGNOSIS — M79.604 LEG PAIN, BILATERAL: ICD-10-CM

## 2024-05-30 DIAGNOSIS — L85.9 HYPERKERATOSIS: ICD-10-CM

## 2024-05-30 DIAGNOSIS — L81.9 HYPERPIGMENTATION OF SKIN: ICD-10-CM

## 2024-05-30 DIAGNOSIS — M79.605 LEG PAIN, BILATERAL: ICD-10-CM

## 2024-05-30 DIAGNOSIS — I87.2 VENOUS INSUFFICIENCY: Primary | ICD-10-CM

## 2024-05-30 PROCEDURE — 99999PBSHW PR PBB SHADOW TECHNICAL ONLY FILED TO HB: Mod: PBBFAC,,,

## 2024-05-30 PROCEDURE — 27000272 HC BANDAGE KERLIX

## 2024-05-30 PROCEDURE — 93971 EXTREMITY STUDY: CPT | Mod: TC,59

## 2024-05-30 PROCEDURE — 36482 ENDOVEN THER CHEM ADHES 1ST: CPT | Mod: S$PBB,LT,, | Performed by: FAMILY MEDICINE

## 2024-05-30 PROCEDURE — 36482 ENDOVEN THER CHEM ADHES 1ST: CPT | Mod: PBBFAC | Performed by: FAMILY MEDICINE

## 2024-05-30 PROCEDURE — 93971 EXTREMITY STUDY: CPT | Mod: 26,,, | Performed by: FAMILY MEDICINE

## 2024-05-30 PROCEDURE — C1888 ENDOVAS NON-CARDIAC ABL CATH: HCPCS

## 2024-05-30 PROCEDURE — 27000932 HC BANDAGE ELAST SELF CLOSURE

## 2024-05-30 RX ORDER — LIDOCAINE HYDROCHLORIDE 10 MG/ML
2 INJECTION INFILTRATION; PERINEURAL
Status: COMPLETED | OUTPATIENT
Start: 2024-05-30 | End: 2024-05-30

## 2024-05-30 RX ORDER — CEFDINIR 300 MG/1
CAPSULE ORAL
COMMUNITY
Start: 2024-03-26

## 2024-05-30 RX ADMIN — LIDOCAINE HYDROCHLORIDE 2 ML: 10 INJECTION, SOLUTION INFILTRATION; PERINEURAL at 09:05

## 2024-05-30 NOTE — PROCEDURES
Date: 5/30/24   Surgeon: Dr Corbin León DO    Procedure: Endovenous Adhesive Ablation of the left Greater Saphenous Vein     Estimated blood loss: 3 cc.  Specimens removed:  None.  Complications:  None.  Implants or grafts:  None.    Findings: Treatment Length  58 (cm):  Maximum diameter of the vein treated 5.5 mm with a maximum reflux time 1.3 seconds.    Pre-Procedure: Patient's vital signs and informed consent were obtained. Patient history was checked and updated with any changes since the last visit. The patient continues to present with symptoms of venous disease as proven via DUS Venous Insufficiency exam completed prior to procedure. A complete Time Out was performed; with all parties present, which verified patient's identification, intended procedure, correct procedure site, and all equipment/supplies needed to complete the procedure.     Procedure: Ultrasound guidance was used to nickolas the target vein with the patient positioned on the treatment table. The entire lower extremity was prepped with a 50/50 % solution of isopropyl alcohol and chlorhexidine; then sterile drapes were applied. Once the desired access point was identified; less than 5mL of 0.5% Lidocaine buffered with Sodium Bicarbonate was distributed, to comfortably gain access in real-time with ultrasound guidance. A guidewire was used as necessary, which allowed insertion of the blue introduction catheter. Once positioned, the delivery catheter was prepped and inserted into the introducer sheath. The delivery catheter position was confirmed via ultrasound 5cm distal to Saphenofemoral junction. Following the IFU, adhesive was delivered, segmentally, into the target vein. Ultrasound visualization confirmed successful treatment of the targeted vein with no evidence of complications at the junction. All devices were then removed, and hemostasis was achieved with a suture. Dressings with compression were applied to the access site and to any  puncture sites requiring them. There was minimal blood loss.

## 2024-05-30 NOTE — PATIENT INSTRUCTIONS
Vein Procedure Discharge Instructions    For the first 48 hours (2 days) following your procedure:  Walk 15 minutes on every hour (while awake).  Keep leg propped up (recliner, couch, etc.) while sitting.  Take pain medication, if prescribed.  Take Ibuprofen, or Acetaminophen, for the next 2-3 days with an over-the-counter anti-acid (Prilosec, Protonix, Nexium, etc.)    After your 48 hours (2 days) Post Op period complete:  Remove dressings, throw away all except the ACE wrap, keep the ACE wrap.  Shower using warm soapy water.  Use separate wash cloth on the treated leg.  No baths for 2 weeks.  Compression up to groin must be worn daily for 2 weeks.  You may use the ACE wrap for entire leg, or compression hose to knees and ACE wrap to groin.  (If you had your small saphenous vein ablated, then compression only to knee)  If your vein was ablated with Varithena (foam), compression must be worn day and night.  Walk for at least 10 minutes daily for the next month.    Activity:  If you have a normal ultrasound one week after your ablation:  You may resume walking activities immediately.  You may resume jogging 2 weeks after your procedure.  You may resume swimming 2 weeks after your procedure.  If you had microphlebectomies, you must make sure all areas are completely healed before swimming.  You may resume lower body weight lifting 2 weeks after your procedure.  You may resume upper body weight lifting while sitting down 2 days after your procedure.  You may resume sexual activities 2 weeks after your procedure.  You may fly commercially 2 weeks after your procedure.  You may scuba dive 1 month after your procedure.    Pilots - You may not fly for 1 month after your procedure.  You must have a normal 1 month post op ultrasound before returning to flight status.

## 2024-06-03 ENCOUNTER — OFFICE VISIT (OUTPATIENT)
Dept: VASCULAR SURGERY | Facility: CLINIC | Age: 79
End: 2024-06-03
Payer: COMMERCIAL

## 2024-06-03 ENCOUNTER — HOSPITAL ENCOUNTER (OUTPATIENT)
Dept: RADIOLOGY | Facility: HOSPITAL | Age: 79
Discharge: HOME | End: 2024-06-03
Attending: FAMILY MEDICINE
Payer: COMMERCIAL

## 2024-06-03 VITALS
RESPIRATION RATE: 16 BRPM | HEART RATE: 70 BPM | HEIGHT: 65 IN | SYSTOLIC BLOOD PRESSURE: 195 MMHG | WEIGHT: 207 LBS | DIASTOLIC BLOOD PRESSURE: 95 MMHG | BODY MASS INDEX: 34.49 KG/M2

## 2024-06-03 DIAGNOSIS — M79.604 LEG PAIN, BILATERAL: ICD-10-CM

## 2024-06-03 DIAGNOSIS — M79.605 LEG PAIN, BILATERAL: ICD-10-CM

## 2024-06-03 DIAGNOSIS — L85.9 HYPERKERATOSIS: ICD-10-CM

## 2024-06-03 DIAGNOSIS — I87.2 VENOUS INSUFFICIENCY: ICD-10-CM

## 2024-06-03 DIAGNOSIS — I87.2 VENOUS INSUFFICIENCY: Primary | ICD-10-CM

## 2024-06-03 DIAGNOSIS — R60.0 EDEMA, LOWER EXTREMITY: ICD-10-CM

## 2024-06-03 PROCEDURE — 3077F SYST BP >= 140 MM HG: CPT | Mod: CPTII,,, | Performed by: FAMILY MEDICINE

## 2024-06-03 PROCEDURE — 3080F DIAST BP >= 90 MM HG: CPT | Mod: CPTII,,, | Performed by: FAMILY MEDICINE

## 2024-06-03 PROCEDURE — 93971 EXTREMITY STUDY: CPT | Mod: 26,,, | Performed by: FAMILY MEDICINE

## 2024-06-03 PROCEDURE — 99214 OFFICE O/P EST MOD 30 MIN: CPT | Mod: S$PBB,,, | Performed by: FAMILY MEDICINE

## 2024-06-03 PROCEDURE — 1160F RVW MEDS BY RX/DR IN RCRD: CPT | Mod: CPTII,,, | Performed by: FAMILY MEDICINE

## 2024-06-03 PROCEDURE — 1159F MED LIST DOCD IN RCRD: CPT | Mod: CPTII,,, | Performed by: FAMILY MEDICINE

## 2024-06-03 PROCEDURE — 99215 OFFICE O/P EST HI 40 MIN: CPT | Mod: PBBFAC,25 | Performed by: FAMILY MEDICINE

## 2024-06-03 PROCEDURE — 93971 EXTREMITY STUDY: CPT | Mod: TC

## 2024-06-04 NOTE — PROGRESS NOTES
VEIN CENTER CLINIC NOTE    Patient ID: Ольга Hutchinson is a 78 y.o. female.    I. HISTORY     Chief Complaint:   Chief Complaint   Patient presents with    Follow-up     4 D LT GSV VENASEAL        HPI: Ольга Hutchinson is a 78 y.o. female who presents for   A 4 day follow-up after undergoing a left great saphenous vein non thermal chemical ablation.  Patient states she did well over the weekend without complications.  States that both of her legs have started to feel lighter and less tight.  She continues to have edema and hyperkeratosis bilaterally.  She has been treated with pink unna boot and Coban 2 Lite wraps with twice weekly wrap changes by home health.  Postop ultrasound shows no evidence of chemical induced thrombus bilaterally and well ablated great saphenous veins bilaterally.  Overall she is doing well.      Clinical summary:  Bilateral complete venous reflux study, 02/13/2024, shows no evidence of DVT bilaterally, however study limited to body habitus.  The study also shows dilation and axial reflux of the bilateral great saphenous veins.  No reflux noted in the bilateral small saphenous veins.    Patient was initially seen on 02/06/2024 by referral from [facility] for evaluation of bilateral lower extremity edema, hyperpigmentation, hyperkeratosis and skin breakdown..  Symptoms are progressive/worsening and began greater than 20 years ago.  Location is bilateral lower extremities below the knees. Symptoms are worse at the end of the day.  History of venous interventions includes none.  Denies family history of venous disease.  Denies history of DVT.  Admits to history of cellulitis.    Venous Disease Medical Necessity Documentation Initial Visit Date:  02/06/2024 Return Check Date:    Have you ever had a rupture or bleed from a varicose vein in your leg(s)?              [] Yes  [x] No   [] Yes   [] No   Have you ever been diagnosed with phlebitis, cellulitis, or inflammation in the area of the varicose  veins of  your leg(s)?  [] Yes  [x] No    [] Yes   [] No   Do you have darkened or inflamed skin on your legs?   [x] Yes   [] No   [] Yes   [] No   Do you have leg swelling?     [x] Yes   [] No   [] Yes   [] No   Do you have leg pain?   [x] Yes   [] No   [] Yes   [] No   If yes, describe the type of pain?    []   Stabbing []  Radiating [x]  Aching   []  Tightness []  Throbbing               []  Burning []  Cramping              Do you have leg discomfort?   [x] Yes   [] No   [] Yes   [] No   If yes, describe the type of discomfort?    [x]  Heaviness [x]  Fullness   []  Restlessness [] Tired/Fatigued [] Itching              Have you ever worn compression hose?   [] Yes   [x] No   [] Yes   [] No   If yes, how long?           Do you elevate your legs while sitting?   [x] Yes   [] No   [] Yes   [] No   Does venous disease (varicose veins, ulcers, skin changes, leg pain/swelling) interfere with your daily life?  If yes, check activities you are limited or unable to do.    []  Shower  []   Walk  []  Exercise  [] Play with children/grandchildren  []  Shop [] Work [] Stand for any period of time [x] Sleep                               [] Sitting for an extended period of time.           [x] Yes   [] No   [] Yes   [] No   Do you exercise/have you tried to exercise (i.e.  Walk our participate in a regular exercise routine)?  [] Yes  [x] No   [] Yes   [] No   BMI 36.67             History reviewed. No pertinent past medical history.     Past Surgical History:   Procedure Laterality Date    ABLATION, CHEMICAL SEALANT, VARICOSE VEIN Right 05/23/2024    Right GSV Venaseal Ablation performed by Dr. Corbin León    ABLATION, CHEMICAL SEALANT, VARICOSE VEIN Left 05/30/2024    Left GSV Venaseal Ablation performed by Dr. Corbin León       Social History     Tobacco Use   Smoking Status Never   Smokeless Tobacco Never         Current Outpatient Medications:     albuterol (PROVENTIL) 2.5 mg /3 mL (0.083 %) nebulizer solution, 3  MILLILITER VIAL NEBULIZED AS NEEDED EVERY 6 HOURS AS NEEDED WHEEZING INHALATION 90 DAYS, Disp: , Rfl:     azelastine (ASTELIN) 137 mcg (0.1 %) nasal spray, 1 PUFF IN EACH NOSTRIL NASALLY TWICE A DAY 90 DAYS, Disp: , Rfl:     baclofen (LIORESAL) 10 MG tablet, Take 10 mg by mouth 3 (three) times daily., Disp: , Rfl:     cefdinir (OMNICEF) 300 MG capsule, take one with meals Orally twice a day for 10 days, Disp: , Rfl:     cephALEXin (KEFLEX) 500 MG capsule, TAKE 1 CAPSULE ORALLY THREE TIMES A DAY X 10 DAYS TAKE WITH A FULL GLASS OF WATER, Disp: , Rfl:     HYDROcodone-acetaminophen (NORCO)  mg per tablet, TAKE 1 ORAL TABLET 3 TIMES A DAY AS NEEDED FOR PAIN DO NOT DRIVE WHILE TAKING THIS MEDICATION TO BE FILLED ON OR AFTER 11-4-2023, Disp: , Rfl:     lactulose (CHRONULAC) 10 gram/15 mL solution, TAKE 10 ML ORALLY EVERY DAY AS NEEDED 90 DAYS, Disp: , Rfl:     levoFLOXacin (LEVAQUIN) 500 MG tablet, TAKE 1 TABLET ORALLY ONCE A DAY 14 DAYS TAKE WITH A FULL GLASS OF WATER, Disp: , Rfl:     loratadine (CLARITIN) 10 mg tablet, Take 10 mg by mouth once daily., Disp: , Rfl:     mupirocin (BACTROBAN) 2 % ointment, Apply topically 3 (three) times daily., Disp: , Rfl:     ondansetron (ZOFRAN-ODT) 4 MG TbDL, Take 4 mg by mouth once., Disp: , Rfl:     pantoprazole (PROTONIX) 40 MG tablet, 1 TABLET FOR REFLUX ORALLY ONCE A DAY 90 DAYS, Disp: , Rfl:     potassium chloride SA (K-DUR,KLOR-CON M) 10 MEQ tablet, 1 TABLET WITH FOOD ORAL ONCE A DAY 90 DAYS, Disp: , Rfl:     senna-docusate 8.6-50 mg (PERICOLACE) 8.6-50 mg per tablet, Take 1 tablet by mouth once daily., Disp: , Rfl:     triamcinolone acetonide 0.1% (KENALOG) 0.1 % cream, Apply topically 2 (two) times daily., Disp: , Rfl:     zinc gluconate 50 mg tablet, Take 50 mg by mouth once daily., Disp: , Rfl:     Review of Systems   Constitutional:  Negative for activity change, chills, diaphoresis, fatigue and fever.   Respiratory:  Negative for cough and shortness of breath.     Cardiovascular:  Positive for leg swelling. Negative for chest pain and claudication.        Hyperpigmentation LE   Gastrointestinal:  Negative for nausea and vomiting.   Musculoskeletal:  Positive for leg pain. Negative for joint swelling.   Integumentary:  Negative for rash and wound.   Neurological:  Negative for weakness and numbness.          II. PHYSICAL EXAM     Physical Exam  Constitutional:       General: She is awake. She is not in acute distress.     Appearance: Normal appearance. She is obese. She is not ill-appearing or toxic-appearing.   HENT:      Head: Normocephalic and atraumatic.   Eyes:      Extraocular Movements: Extraocular movements intact.      Conjunctiva/sclera: Conjunctivae normal.      Pupils: Pupils are equal, round, and reactive to light.   Neck:      Vascular: No carotid bruit or JVD.   Cardiovascular:      Rate and Rhythm: Normal rate and regular rhythm.      Pulses:           Dorsalis pedis pulses are detected w/ Doppler on the left side.      Heart sounds: No murmur heard.  Pulmonary:      Effort: Pulmonary effort is normal. No respiratory distress.      Breath sounds: No stridor. No wheezing, rhonchi or rales.   Musculoskeletal:         General: No swelling, tenderness or deformity.      Right lower leg: 3+ Pitting Edema present.      Left lower leg: 3+ Pitting Edema present.      Comments: Massive edema of the bilateral lower extremities with hyperkeratosis with plaques and some superficial skin breakdown.  No signs of active cellulitis noted today.   Feet:      Comments: Triphasic left dorsalis pedis pulse detected with hand-held Doppler.  Other pulses undetectable secondary to hyperkeratosis plaques.  Skin:     General: Skin is warm.      Capillary Refill: Capillary refill takes less than 2 seconds.      Coloration: Skin is not ashen.      Findings: No bruising, erythema, lesion, rash or wound.   Neurological:      Mental Status: She is alert and oriented to person, place, and  time.      Motor: No weakness.   Psychiatric:         Speech: Speech normal.         Behavior: Behavior normal. Behavior is cooperative.         Reticular/Spider veins noted:  RLE: none  LLE: none    Varicose veins noted:  RLE: none  LLE:  none    CEAP Classification  Clinical Signs: Class 6 - Leg ulceration, skin changes as defined above  Etiologic Classification: Primary  Anatomic distribution: Superficial  Pathophysiologic dysfunction: Reflux                     Venous Clinical Severity Score  Pain:2=Daily, moderate activity limitation, occasional analgesics  Varicose Veins: 0=None  Venous Edema: 3=Morning edema above ankle and requiring activity change, elevation  Pigmentation: 3=Wider distribution (above lower 1/3) and recent pigmentation  Inflammation: 0=None  Induration: 3=Entire lower third of leg or more  Number of Active Ulcers: 2=2  Active Ulceration, Duration: 2=>3 months, <1 year  Active Ulcer Size: 1=<2 cm diameter  Compressive Therapy: 3=Full compliance, stockings + elevation  Total Score: 19       III. ASSESSMENT & PLAN (MEDICAL DECISION MAKING)     1. Venous insufficiency    2. Hyperkeratosis    3. Leg pain, bilateral    4. Edema, lower extremity        Assessment/Diagnosis and Plan:    - continue with pink unna boot and Coban 2 Lite wraps with twice weekly wrap changes by home health.  Also wrap to the thighs with Ace wrap for the next 2 weeks postoperatively.  - Therapeutic leg elevation  - Calf pumping exercises  -Follow-up in 1 month.          Shabbir León, DO

## 2024-07-09 ENCOUNTER — OFFICE VISIT (OUTPATIENT)
Dept: VASCULAR SURGERY | Facility: CLINIC | Age: 79
End: 2024-07-09
Payer: COMMERCIAL

## 2024-07-09 VITALS
BODY MASS INDEX: 34.32 KG/M2 | HEART RATE: 75 BPM | WEIGHT: 206 LBS | DIASTOLIC BLOOD PRESSURE: 74 MMHG | RESPIRATION RATE: 15 BRPM | SYSTOLIC BLOOD PRESSURE: 141 MMHG | HEIGHT: 65 IN

## 2024-07-09 DIAGNOSIS — R60.0 EDEMA, LOWER EXTREMITY: ICD-10-CM

## 2024-07-09 DIAGNOSIS — L85.9 HYPERKERATOSIS: ICD-10-CM

## 2024-07-09 DIAGNOSIS — L81.9 HYPERPIGMENTATION OF SKIN: ICD-10-CM

## 2024-07-09 DIAGNOSIS — I87.2 VENOUS INSUFFICIENCY: Primary | ICD-10-CM

## 2024-07-09 PROCEDURE — 3077F SYST BP >= 140 MM HG: CPT | Mod: CPTII,,, | Performed by: FAMILY MEDICINE

## 2024-07-09 PROCEDURE — 99213 OFFICE O/P EST LOW 20 MIN: CPT | Mod: S$PBB,,, | Performed by: FAMILY MEDICINE

## 2024-07-09 PROCEDURE — 3078F DIAST BP <80 MM HG: CPT | Mod: CPTII,,, | Performed by: FAMILY MEDICINE

## 2024-07-09 PROCEDURE — 1160F RVW MEDS BY RX/DR IN RCRD: CPT | Mod: CPTII,,, | Performed by: FAMILY MEDICINE

## 2024-07-09 PROCEDURE — 1159F MED LIST DOCD IN RCRD: CPT | Mod: CPTII,,, | Performed by: FAMILY MEDICINE

## 2024-07-09 PROCEDURE — 99215 OFFICE O/P EST HI 40 MIN: CPT | Mod: PBBFAC | Performed by: FAMILY MEDICINE

## 2024-07-09 PROCEDURE — 99999 PR PBB SHADOW E&M-EST. PATIENT-LVL V: CPT | Mod: PBBFAC,,, | Performed by: FAMILY MEDICINE

## 2024-07-09 NOTE — PROGRESS NOTES
VEIN CENTER CLINIC NOTE    Patient ID: Ольга Hutchinson is a 78 y.o. female.    I. HISTORY     Chief Complaint:   Chief Complaint   Patient presents with    Follow-up     1M PA        HPI: Ольга Hutchinson is a 78 y.o. female who presents for a 1 month follow-up after undergoing a bilateral great saphenous veins non thermal adhesive ablations.  The patient states she is done well since her procedure with improvement of swelling and discomfort in her lower extremities.  Her skin condition also continues to improve.  She has heavy hyperkeratosis bilaterally without open ulceration at this time.  No evidence of active cellulitis or infection.  Home health has since stop coming out over the past several weeks.  Her daughter has been wrapping her legs with pink unna boot and Coban 2 Lite compression wraps.      Clinical summary:  Bilateral complete venous reflux study, 02/13/2024, shows no evidence of DVT bilaterally, however study limited to body habitus.  The study also shows dilation and axial reflux of the bilateral great saphenous veins.  No reflux noted in the bilateral small saphenous veins.    Patient was initially seen on 02/06/2024 by referral from [facility] for evaluation of bilateral lower extremity edema, hyperpigmentation, hyperkeratosis and skin breakdown..  Symptoms are progressive/worsening and began greater than 20 years ago.  Location is bilateral lower extremities below the knees. Symptoms are worse at the end of the day.  History of venous interventions includes none.  Denies family history of venous disease.  Denies history of DVT.  Admits to history of cellulitis.    Venous Disease Medical Necessity Documentation Initial Visit Date:  02/06/2024 Return Check Date:    Have you ever had a rupture or bleed from a varicose vein in your leg(s)?              [] Yes  [x] No   [] Yes   [] No   Have you ever been diagnosed with phlebitis, cellulitis, or inflammation in the area of the varicose veins of  your  leg(s)?  [] Yes  [x] No    [] Yes   [] No   Do you have darkened or inflamed skin on your legs?   [x] Yes   [] No   [] Yes   [] No   Do you have leg swelling?     [x] Yes   [] No   [] Yes   [] No   Do you have leg pain?   [x] Yes   [] No   [] Yes   [] No   If yes, describe the type of pain?    []   Stabbing []  Radiating [x]  Aching   []  Tightness []  Throbbing               []  Burning []  Cramping              Do you have leg discomfort?   [x] Yes   [] No   [] Yes   [] No   If yes, describe the type of discomfort?    [x]  Heaviness [x]  Fullness   []  Restlessness [] Tired/Fatigued [] Itching              Have you ever worn compression hose?   [] Yes   [x] No   [] Yes   [] No   If yes, how long?           Do you elevate your legs while sitting?   [x] Yes   [] No   [] Yes   [] No   Does venous disease (varicose veins, ulcers, skin changes, leg pain/swelling) interfere with your daily life?  If yes, check activities you are limited or unable to do.    []  Shower  []   Walk  []  Exercise  [] Play with children/grandchildren  []  Shop [] Work [] Stand for any period of time [x] Sleep                               [] Sitting for an extended period of time.           [x] Yes   [] No   [] Yes   [] No   Do you exercise/have you tried to exercise (i.e.  Walk our participate in a regular exercise routine)?  [] Yes  [x] No   [] Yes   [] No   BMI 36.67             History reviewed. No pertinent past medical history.     Past Surgical History:   Procedure Laterality Date    ABLATION, CHEMICAL SEALANT, VARICOSE VEIN Right 05/23/2024    Right GSV Venaseal Ablation performed by Dr. Corbin León    ABLATION, CHEMICAL SEALANT, VARICOSE VEIN Left 05/30/2024    Left GSV Venaseal Ablation performed by Dr. Corbin León       Social History     Tobacco Use   Smoking Status Never   Smokeless Tobacco Never         Current Outpatient Medications:     albuterol (PROVENTIL) 2.5 mg /3 mL (0.083 %) nebulizer solution, 3 MILLILITER VIAL  NEBULIZED AS NEEDED EVERY 6 HOURS AS NEEDED WHEEZING INHALATION 90 DAYS, Disp: , Rfl:     azelastine (ASTELIN) 137 mcg (0.1 %) nasal spray, 1 PUFF IN EACH NOSTRIL NASALLY TWICE A DAY 90 DAYS, Disp: , Rfl:     baclofen (LIORESAL) 10 MG tablet, Take 10 mg by mouth 3 (three) times daily., Disp: , Rfl:     cefdinir (OMNICEF) 300 MG capsule, take one with meals Orally twice a day for 10 days, Disp: , Rfl:     cephALEXin (KEFLEX) 500 MG capsule, TAKE 1 CAPSULE ORALLY THREE TIMES A DAY X 10 DAYS TAKE WITH A FULL GLASS OF WATER, Disp: , Rfl:     HYDROcodone-acetaminophen (NORCO)  mg per tablet, TAKE 1 ORAL TABLET 3 TIMES A DAY AS NEEDED FOR PAIN DO NOT DRIVE WHILE TAKING THIS MEDICATION TO BE FILLED ON OR AFTER 11-4-2023, Disp: , Rfl:     lactulose (CHRONULAC) 10 gram/15 mL solution, TAKE 10 ML ORALLY EVERY DAY AS NEEDED 90 DAYS, Disp: , Rfl:     levoFLOXacin (LEVAQUIN) 500 MG tablet, TAKE 1 TABLET ORALLY ONCE A DAY 14 DAYS TAKE WITH A FULL GLASS OF WATER, Disp: , Rfl:     loratadine (CLARITIN) 10 mg tablet, Take 10 mg by mouth once daily., Disp: , Rfl:     mupirocin (BACTROBAN) 2 % ointment, Apply topically 3 (three) times daily., Disp: , Rfl:     ondansetron (ZOFRAN-ODT) 4 MG TbDL, Take 4 mg by mouth once., Disp: , Rfl:     pantoprazole (PROTONIX) 40 MG tablet, 1 TABLET FOR REFLUX ORALLY ONCE A DAY 90 DAYS, Disp: , Rfl:     potassium chloride SA (K-DUR,KLOR-CON M) 10 MEQ tablet, 1 TABLET WITH FOOD ORAL ONCE A DAY 90 DAYS, Disp: , Rfl:     senna-docusate 8.6-50 mg (PERICOLACE) 8.6-50 mg per tablet, Take 1 tablet by mouth once daily., Disp: , Rfl:     triamcinolone acetonide 0.1% (KENALOG) 0.1 % cream, Apply topically 2 (two) times daily., Disp: , Rfl:     zinc gluconate 50 mg tablet, Take 50 mg by mouth once daily., Disp: , Rfl:     Review of Systems   Constitutional:  Negative for activity change, chills, diaphoresis, fatigue and fever.   Respiratory:  Negative for cough and shortness of breath.    Cardiovascular:   Positive for leg swelling. Negative for chest pain and claudication.        Hyperpigmentation LE   Gastrointestinal:  Negative for nausea and vomiting.   Musculoskeletal:  Positive for leg pain. Negative for joint swelling.   Integumentary:  Negative for rash and wound.   Neurological:  Negative for weakness and numbness.        II. PHYSICAL EXAM     Physical Exam  Constitutional:       General: She is awake. She is not in acute distress.     Appearance: Normal appearance. She is obese. She is not ill-appearing or toxic-appearing.   HENT:      Head: Normocephalic and atraumatic.   Eyes:      Extraocular Movements: Extraocular movements intact.      Conjunctiva/sclera: Conjunctivae normal.      Pupils: Pupils are equal, round, and reactive to light.   Neck:      Vascular: No carotid bruit or JVD.   Cardiovascular:      Rate and Rhythm: Normal rate and regular rhythm.      Pulses:           Dorsalis pedis pulses are detected w/ Doppler on the left side.      Heart sounds: No murmur heard.  Pulmonary:      Effort: Pulmonary effort is normal. No respiratory distress.      Breath sounds: No stridor. No wheezing, rhonchi or rales.   Musculoskeletal:         General: No swelling, tenderness or deformity.      Right lower leg: 3+ Pitting Edema present.      Left lower leg: 3+ Pitting Edema present.      Comments: Massive edema of the bilateral lower extremities with hyperkeratosis with plaques and some superficial skin breakdown.  No signs of active cellulitis noted today.   Feet:      Comments: Triphasic left dorsalis pedis pulse detected with hand-held Doppler.  Other pulses undetectable secondary to hyperkeratosis plaques.  Skin:     General: Skin is warm.      Capillary Refill: Capillary refill takes less than 2 seconds.      Coloration: Skin is not ashen.      Findings: No bruising, erythema, lesion, rash or wound.   Neurological:      Mental Status: She is alert and oriented to person, place, and time.      Motor: No  weakness.   Psychiatric:         Speech: Speech normal.         Behavior: Behavior normal. Behavior is cooperative.         Reticular/Spider veins noted:  RLE: none  LLE: none    Varicose veins noted:  RLE: none  LLE:  none    CEAP Classification  Clinical Signs: Class 6 - Leg ulceration, skin changes as defined above  Etiologic Classification: Primary  Anatomic distribution: Superficial  Pathophysiologic dysfunction: Reflux           Venous Clinical Severity Score  Pain:2=Daily, moderate activity limitation, occasional analgesics  Varicose Veins: 0=None  Venous Edema: 3=Morning edema above ankle and requiring activity change, elevation  Pigmentation: 3=Wider distribution (above lower 1/3) and recent pigmentation  Inflammation: 0=None  Induration: 3=Entire lower third of leg or more  Number of Active Ulcers: 2=2  Active Ulceration, Duration: 2=>3 months, <1 year  Active Ulcer Size: 1=<2 cm diameter  Compressive Therapy: 3=Full compliance, stockings + elevation  Total Score: 19       III. ASSESSMENT & PLAN (MEDICAL DECISION MAKING)     1. Venous insufficiency    2. Hyperkeratosis    3. Edema, lower extremity    4. Hyperpigmentation of skin          Assessment/Diagnosis and Plan:    - continue with pink unna boot and Coban 2 Lite wraps with twice weekly wrap changes by home health.  I have written new orders today to this effect.  PT orders also occluded.  - Therapeutic leg elevation  - Calf pumping exercises  -Follow-up in 2 months for three-month post ablation follow-up.          Shabbir León, DO

## 2024-09-10 ENCOUNTER — OFFICE VISIT (OUTPATIENT)
Dept: VASCULAR SURGERY | Facility: CLINIC | Age: 79
End: 2024-09-10
Payer: COMMERCIAL

## 2024-09-10 VITALS
BODY MASS INDEX: 36.49 KG/M2 | RESPIRATION RATE: 14 BRPM | DIASTOLIC BLOOD PRESSURE: 86 MMHG | HEIGHT: 63 IN | WEIGHT: 205.94 LBS | HEART RATE: 85 BPM | SYSTOLIC BLOOD PRESSURE: 147 MMHG

## 2024-09-10 DIAGNOSIS — L85.9 HYPERKERATOSIS: ICD-10-CM

## 2024-09-10 DIAGNOSIS — L81.9 HYPERPIGMENTATION OF SKIN: ICD-10-CM

## 2024-09-10 DIAGNOSIS — R60.0 EDEMA, LOWER EXTREMITY: ICD-10-CM

## 2024-09-10 DIAGNOSIS — I87.2 VENOUS INSUFFICIENCY: Primary | ICD-10-CM

## 2024-09-10 PROCEDURE — 3077F SYST BP >= 140 MM HG: CPT | Mod: CPTII,,, | Performed by: FAMILY MEDICINE

## 2024-09-10 PROCEDURE — 99999 PR PBB SHADOW E&M-EST. PATIENT-LVL V: CPT | Mod: PBBFAC,,, | Performed by: FAMILY MEDICINE

## 2024-09-10 PROCEDURE — 99215 OFFICE O/P EST HI 40 MIN: CPT | Mod: PBBFAC | Performed by: FAMILY MEDICINE

## 2024-09-10 PROCEDURE — 3079F DIAST BP 80-89 MM HG: CPT | Mod: CPTII,,, | Performed by: FAMILY MEDICINE

## 2024-09-10 PROCEDURE — 1160F RVW MEDS BY RX/DR IN RCRD: CPT | Mod: CPTII,,, | Performed by: FAMILY MEDICINE

## 2024-09-10 PROCEDURE — 99213 OFFICE O/P EST LOW 20 MIN: CPT | Mod: S$PBB,,, | Performed by: FAMILY MEDICINE

## 2024-09-10 PROCEDURE — 1159F MED LIST DOCD IN RCRD: CPT | Mod: CPTII,,, | Performed by: FAMILY MEDICINE

## 2024-09-10 NOTE — PROGRESS NOTES
VEIN CENTER CLINIC NOTE    Patient ID: Ольга Hutchinson is a 79 y.o. female.    I. HISTORY     Chief Complaint:   Chief Complaint   Patient presents with    Follow-up     3M PA         HPI: лОьга Hutchinson is a 79 y.o. female who presents for a 3 month follow-up after undergoing a bilateral great saphenous veins non thermal adhesive ablations.  The patient states she is done well since her procedure with improvement of swelling and discomfort in her lower extremities.  Her skin condition also continues to improve.  She has heavy hyperkeratosis bilaterally without open ulceration at this time.  No evidence of active cellulitis or infection.  Home health has since stop coming out over the past several weeks.  Her daughter has been wrapping her legs with pink unna boot and Coban 2 Lite compression wraps.      Clinical summary:  Bilateral complete venous reflux study, 02/13/2024, shows no evidence of DVT bilaterally, however study limited to body habitus.  The study also shows dilation and axial reflux of the bilateral great saphenous veins.  No reflux noted in the bilateral small saphenous veins.    Patient was initially seen on 02/06/2024 by referral from Ankita Colindres [facility] for evaluation of bilateral lower extremity edema, hyperpigmentation, hyperkeratosis and skin breakdown..  Symptoms are progressive/worsening and began greater than 20 years ago.  Location is bilateral lower extremities below the knees. Symptoms are worse at the end of the day.  History of venous interventions includes none.  Denies family history of venous disease.  Denies history of DVT.  Admits to history of cellulitis.    Venous Disease Medical Necessity Documentation Initial Visit Date:  02/06/2024 Return Check Date:    Have you ever had a rupture or bleed from a varicose vein in your leg(s)?              [] Yes  [x] No   [] Yes   [] No   Have you ever been diagnosed with phlebitis, cellulitis, or inflammation in the area of the varicose veins of  your  leg(s)?  [] Yes  [x] No    [] Yes   [] No   Do you have darkened or inflamed skin on your legs?   [x] Yes   [] No   [] Yes   [] No   Do you have leg swelling?     [x] Yes   [] No   [] Yes   [] No   Do you have leg pain?   [x] Yes   [] No   [] Yes   [] No   If yes, describe the type of pain?    []   Stabbing []  Radiating [x]  Aching   []  Tightness []  Throbbing               []  Burning []  Cramping              Do you have leg discomfort?   [x] Yes   [] No   [] Yes   [] No   If yes, describe the type of discomfort?    [x]  Heaviness [x]  Fullness   []  Restlessness [] Tired/Fatigued [] Itching              Have you ever worn compression hose?   [] Yes   [x] No   [] Yes   [] No   If yes, how long?           Do you elevate your legs while sitting?   [x] Yes   [] No   [] Yes   [] No   Does venous disease (varicose veins, ulcers, skin changes, leg pain/swelling) interfere with your daily life?  If yes, check activities you are limited or unable to do.    []  Shower  []   Walk  []  Exercise  [] Play with children/grandchildren  []  Shop [] Work [] Stand for any period of time [x] Sleep                               [] Sitting for an extended period of time.           [x] Yes   [] No   [] Yes   [] No   Do you exercise/have you tried to exercise (i.e.  Walk our participate in a regular exercise routine)?  [] Yes  [x] No   [] Yes   [] No   BMI 36.67             History reviewed. No pertinent past medical history.     Past Surgical History:   Procedure Laterality Date    ABLATION, CHEMICAL SEALANT, VARICOSE VEIN Right 05/23/2024    Right GSV Venaseal Ablation performed by Dr. Corbin León    ABLATION, CHEMICAL SEALANT, VARICOSE VEIN Left 05/30/2024    Left GSV Venaseal Ablation performed by Dr. Corbin León       Social History     Tobacco Use   Smoking Status Never   Smokeless Tobacco Never         Current Outpatient Medications:     albuterol (PROVENTIL) 2.5 mg /3 mL (0.083 %) nebulizer solution, 3 MILLILITER VIAL  NEBULIZED AS NEEDED EVERY 6 HOURS AS NEEDED WHEEZING INHALATION 90 DAYS, Disp: , Rfl:     azelastine (ASTELIN) 137 mcg (0.1 %) nasal spray, 1 PUFF IN EACH NOSTRIL NASALLY TWICE A DAY 90 DAYS, Disp: , Rfl:     baclofen (LIORESAL) 10 MG tablet, Take 10 mg by mouth 3 (three) times daily., Disp: , Rfl:     cefdinir (OMNICEF) 300 MG capsule, take one with meals Orally twice a day for 10 days, Disp: , Rfl:     cephALEXin (KEFLEX) 500 MG capsule, TAKE 1 CAPSULE ORALLY THREE TIMES A DAY X 10 DAYS TAKE WITH A FULL GLASS OF WATER, Disp: , Rfl:     HYDROcodone-acetaminophen (NORCO)  mg per tablet, TAKE 1 ORAL TABLET 3 TIMES A DAY AS NEEDED FOR PAIN DO NOT DRIVE WHILE TAKING THIS MEDICATION TO BE FILLED ON OR AFTER 11-4-2023, Disp: , Rfl:     lactulose (CHRONULAC) 10 gram/15 mL solution, TAKE 10 ML ORALLY EVERY DAY AS NEEDED 90 DAYS, Disp: , Rfl:     levoFLOXacin (LEVAQUIN) 500 MG tablet, TAKE 1 TABLET ORALLY ONCE A DAY 14 DAYS TAKE WITH A FULL GLASS OF WATER, Disp: , Rfl:     loratadine (CLARITIN) 10 mg tablet, Take 10 mg by mouth once daily., Disp: , Rfl:     mupirocin (BACTROBAN) 2 % ointment, Apply topically 3 (three) times daily., Disp: , Rfl:     ondansetron (ZOFRAN-ODT) 4 MG TbDL, Take 4 mg by mouth once., Disp: , Rfl:     pantoprazole (PROTONIX) 40 MG tablet, 1 TABLET FOR REFLUX ORALLY ONCE A DAY 90 DAYS, Disp: , Rfl:     potassium chloride SA (K-DUR,KLOR-CON M) 10 MEQ tablet, 1 TABLET WITH FOOD ORAL ONCE A DAY 90 DAYS, Disp: , Rfl:     senna-docusate 8.6-50 mg (PERICOLACE) 8.6-50 mg per tablet, Take 1 tablet by mouth once daily., Disp: , Rfl:     triamcinolone acetonide 0.1% (KENALOG) 0.1 % cream, Apply topically 2 (two) times daily., Disp: , Rfl:     zinc gluconate 50 mg tablet, Take 50 mg by mouth once daily., Disp: , Rfl:     Review of Systems   Constitutional:  Negative for activity change, chills, diaphoresis, fatigue and fever.   Respiratory:  Negative for cough and shortness of breath.    Cardiovascular:   Positive for leg swelling. Negative for chest pain and claudication.        Hyperpigmentation LE   Gastrointestinal:  Negative for nausea and vomiting.   Musculoskeletal:  Positive for leg pain. Negative for joint swelling.   Integumentary:  Negative for rash and wound.   Neurological:  Negative for weakness and numbness.        II. PHYSICAL EXAM     Physical Exam  Constitutional:       General: She is awake. She is not in acute distress.     Appearance: Normal appearance. She is obese. She is not ill-appearing or toxic-appearing.   HENT:      Head: Normocephalic and atraumatic.   Eyes:      Extraocular Movements: Extraocular movements intact.      Conjunctiva/sclera: Conjunctivae normal.      Pupils: Pupils are equal, round, and reactive to light.   Neck:      Vascular: No carotid bruit or JVD.   Cardiovascular:      Rate and Rhythm: Normal rate and regular rhythm.      Pulses:           Dorsalis pedis pulses are detected w/ Doppler on the left side.      Heart sounds: No murmur heard.  Pulmonary:      Effort: Pulmonary effort is normal. No respiratory distress.      Breath sounds: No stridor. No wheezing, rhonchi or rales.   Musculoskeletal:         General: No swelling, tenderness or deformity.      Right lower leg: 3+ Pitting Edema present.      Left lower leg: 3+ Pitting Edema present.      Comments: Massive edema of the bilateral lower extremities with hyperkeratosis with plaques and some superficial skin breakdown.  No signs of active cellulitis noted today.   Feet:      Comments: Triphasic left dorsalis pedis pulse detected with hand-held Doppler.  Other pulses undetectable secondary to hyperkeratosis plaques.  Skin:     General: Skin is warm.      Capillary Refill: Capillary refill takes less than 2 seconds.      Coloration: Skin is not ashen.      Findings: No bruising, erythema, lesion, rash or wound.   Neurological:      Mental Status: She is alert and oriented to person, place, and time.      Motor: No  weakness.   Psychiatric:         Speech: Speech normal.         Behavior: Behavior normal. Behavior is cooperative.         Reticular/Spider veins noted:  RLE: none  LLE: none    Varicose veins noted:  RLE: none  LLE:  none    CEAP Classification  Clinical Signs: Class 6 - Leg ulceration, skin changes as defined above  Etiologic Classification: Primary  Anatomic distribution: Superficial  Pathophysiologic dysfunction: Reflux           Venous Clinical Severity Score  Pain:2=Daily, moderate activity limitation, occasional analgesics  Varicose Veins: 0=None  Venous Edema: 3=Morning edema above ankle and requiring activity change, elevation  Pigmentation: 3=Wider distribution (above lower 1/3) and recent pigmentation  Inflammation: 0=None  Induration: 3=Entire lower third of leg or more  Number of Active Ulcers: 2=2  Active Ulceration, Duration: 2=>3 months, <1 year  Active Ulcer Size: 1=<2 cm diameter  Compressive Therapy: 3=Full compliance, stockings + elevation  Total Score: 19       III. ASSESSMENT & PLAN (MEDICAL DECISION MAKING)     1. Venous insufficiency    2. Hyperkeratosis    3. Edema, lower extremity    4. Hyperpigmentation of skin            Assessment/Diagnosis and Plan:    - continue with pink unna boot and Coban 2 Lite wraps with twice weekly wrap changes by home health.  I have written new orders for home supplies.  - Therapeutic leg elevation  - Calf pumping exercises  -Follow-up in 3 months for 6-month post ablation follow-up.          Shabbir León, DO

## 2025-01-07 ENCOUNTER — OFFICE VISIT (OUTPATIENT)
Dept: VASCULAR SURGERY | Facility: CLINIC | Age: 80
End: 2025-01-07
Payer: COMMERCIAL

## 2025-01-07 VITALS
DIASTOLIC BLOOD PRESSURE: 68 MMHG | HEIGHT: 63 IN | WEIGHT: 205.94 LBS | SYSTOLIC BLOOD PRESSURE: 128 MMHG | RESPIRATION RATE: 14 BRPM | BODY MASS INDEX: 36.49 KG/M2 | HEART RATE: 69 BPM

## 2025-01-07 DIAGNOSIS — L85.9 HYPERKERATOSIS: ICD-10-CM

## 2025-01-07 DIAGNOSIS — R60.0 EDEMA, LOWER EXTREMITY: ICD-10-CM

## 2025-01-07 DIAGNOSIS — I87.2 VENOUS INSUFFICIENCY: Primary | ICD-10-CM

## 2025-01-07 PROCEDURE — 3078F DIAST BP <80 MM HG: CPT | Mod: CPTII,,, | Performed by: FAMILY MEDICINE

## 2025-01-07 PROCEDURE — 1159F MED LIST DOCD IN RCRD: CPT | Mod: CPTII,,, | Performed by: FAMILY MEDICINE

## 2025-01-07 PROCEDURE — 3074F SYST BP LT 130 MM HG: CPT | Mod: CPTII,,, | Performed by: FAMILY MEDICINE

## 2025-01-07 PROCEDURE — 99214 OFFICE O/P EST MOD 30 MIN: CPT | Mod: S$PBB,,, | Performed by: FAMILY MEDICINE

## 2025-01-07 PROCEDURE — 99999 PR PBB SHADOW E&M-EST. PATIENT-LVL IV: CPT | Mod: PBBFAC,,, | Performed by: FAMILY MEDICINE

## 2025-01-07 PROCEDURE — 1160F RVW MEDS BY RX/DR IN RCRD: CPT | Mod: CPTII,,, | Performed by: FAMILY MEDICINE

## 2025-01-07 PROCEDURE — 99214 OFFICE O/P EST MOD 30 MIN: CPT | Mod: PBBFAC | Performed by: FAMILY MEDICINE

## 2025-01-07 RX ORDER — CEPHALEXIN 500 MG/1
500 CAPSULE ORAL EVERY 8 HOURS
Qty: 21 CAPSULE | Refills: 0 | Status: SHIPPED | OUTPATIENT
Start: 2025-01-07

## 2025-01-07 NOTE — PROGRESS NOTES
VEIN CENTER CLINIC NOTE    Patient ID: Ольга Hutchinson is a 79 y.o. female.    I. HISTORY     Chief Complaint:   Chief Complaint   Patient presents with    Follow-up     6M PA         HPI: Ольга Hutchinson is a 79 y.o. female who presents for a 6 month follow-up after undergoing a bilateral great saphenous veins non thermal adhesive ablations.  The patient states that she has continued to have lower extremity edema, hyperpigmentation and hyperkeratosis bilaterally.  Since last visit, home health has stopped coming out and her daughter has continued to wrap with Kerlix and Coban.  She has started to have some superficial skin breakdown bilaterally, worse on the right.  See pictures for details.  She would benefit from appropriate skin conditioning and compression wraps.      Clinical summary:  Bilateral complete venous reflux study, 02/13/2024, shows no evidence of DVT bilaterally, however study limited to body habitus.  The study also shows dilation and axial reflux of the bilateral great saphenous veins.  No reflux noted in the bilateral small saphenous veins.    Patient was initially seen on 02/06/2024 by referral from Ank[facility] Colindres [facility] for evaluation of bilateral lower extremity edema, hyperpigmentation, hyperkeratosis and skin breakdown..  Symptoms are progressive/worsening and began greater than 20 years ago.  Location is bilateral lower extremities below the knees. Symptoms are worse at the end of the day.  History of venous interventions includes none.  Denies family history of venous disease.  Denies history of DVT.  Admits to history of cellulitis.    Venous Disease Medical Necessity Documentation Initial Visit Date:  02/06/2024 Return Check Date:    Have you ever had a rupture or bleed from a varicose vein in your leg(s)?              [] Yes  [x] No   [] Yes   [] No   Have you ever been diagnosed with phlebitis, cellulitis, or inflammation in the area of the varicose veins of  your leg(s)?  [] Yes  [x] No    []  Yes   [] No   Do you have darkened or inflamed skin on your legs?   [x] Yes   [] No   [] Yes   [] No   Do you have leg swelling?     [x] Yes   [] No   [] Yes   [] No   Do you have leg pain?   [x] Yes   [] No   [] Yes   [] No   If yes, describe the type of pain?    []   Stabbing []  Radiating [x]  Aching   []  Tightness []  Throbbing               []  Burning []  Cramping              Do you have leg discomfort?   [x] Yes   [] No   [] Yes   [] No   If yes, describe the type of discomfort?    [x]  Heaviness [x]  Fullness   []  Restlessness [] Tired/Fatigued [] Itching              Have you ever worn compression hose?   [] Yes   [x] No   [] Yes   [] No   If yes, how long?           Do you elevate your legs while sitting?   [x] Yes   [] No   [] Yes   [] No   Does venous disease (varicose veins, ulcers, skin changes, leg pain/swelling) interfere with your daily life?  If yes, check activities you are limited or unable to do.    []  Shower  []   Walk  []  Exercise  [] Play with children/grandchildren  []  Shop [] Work [] Stand for any period of time [x] Sleep                               [] Sitting for an extended period of time.           [x] Yes   [] No   [] Yes   [] No   Do you exercise/have you tried to exercise (i.e.  Walk our participate in a regular exercise routine)?  [] Yes  [x] No   [] Yes   [] No   BMI 36.67             History reviewed. No pertinent past medical history.     Past Surgical History:   Procedure Laterality Date    ABLATION, CHEMICAL SEALANT, VARICOSE VEIN Right 05/23/2024    Right GSV Venaseal Ablation performed by Dr. Corbin León    ABLATION, CHEMICAL SEALANT, VARICOSE VEIN Left 05/30/2024    Left GSV Venaseal Ablation performed by Dr. Corbin León       Social History     Tobacco Use   Smoking Status Never   Smokeless Tobacco Never         Current Outpatient Medications:     albuterol (PROVENTIL) 2.5 mg /3 mL (0.083 %) nebulizer solution, 3 MILLILITER VIAL NEBULIZED AS NEEDED EVERY 6 HOURS AS  NEEDED WHEEZING INHALATION 90 DAYS, Disp: , Rfl:     azelastine (ASTELIN) 137 mcg (0.1 %) nasal spray, 1 PUFF IN EACH NOSTRIL NASALLY TWICE A DAY 90 DAYS, Disp: , Rfl:     baclofen (LIORESAL) 10 MG tablet, Take 10 mg by mouth 3 (three) times daily., Disp: , Rfl:     HYDROcodone-acetaminophen (NORCO)  mg per tablet, TAKE 1 ORAL TABLET 3 TIMES A DAY AS NEEDED FOR PAIN DO NOT DRIVE WHILE TAKING THIS MEDICATION TO BE FILLED ON OR AFTER 11-4-2023, Disp: , Rfl:     lactulose (CHRONULAC) 10 gram/15 mL solution, TAKE 10 ML ORALLY EVERY DAY AS NEEDED 90 DAYS, Disp: , Rfl:     loratadine (CLARITIN) 10 mg tablet, Take 10 mg by mouth once daily., Disp: , Rfl:     mupirocin (BACTROBAN) 2 % ointment, Apply topically 3 (three) times daily., Disp: , Rfl:     ondansetron (ZOFRAN-ODT) 4 MG TbDL, Take 4 mg by mouth once., Disp: , Rfl:     pantoprazole (PROTONIX) 40 MG tablet, 1 TABLET FOR REFLUX ORALLY ONCE A DAY 90 DAYS, Disp: , Rfl:     potassium chloride SA (K-DUR,KLOR-CON M) 10 MEQ tablet, 1 TABLET WITH FOOD ORAL ONCE A DAY 90 DAYS, Disp: , Rfl:     senna-docusate 8.6-50 mg (PERICOLACE) 8.6-50 mg per tablet, Take 1 tablet by mouth once daily., Disp: , Rfl:     triamcinolone acetonide 0.1% (KENALOG) 0.1 % cream, Apply topically 2 (two) times daily., Disp: , Rfl:     zinc gluconate 50 mg tablet, Take 50 mg by mouth once daily., Disp: , Rfl:     cephALEXin (KEFLEX) 500 MG capsule, Take 1 capsule (500 mg total) by mouth every 8 (eight) hours., Disp: 21 capsule, Rfl: 0    Review of Systems   Constitutional:  Negative for activity change, chills, diaphoresis, fatigue and fever.   Respiratory:  Negative for cough and shortness of breath.    Cardiovascular:  Positive for leg swelling. Negative for chest pain and claudication.        Hyperpigmentation LE   Gastrointestinal:  Negative for nausea and vomiting.   Musculoskeletal:  Positive for leg pain. Negative for joint swelling.   Integumentary:  Negative for rash and wound.    Neurological:  Negative for weakness and numbness.        II. PHYSICAL EXAM     Physical Exam  Constitutional:       General: She is awake. She is not in acute distress.     Appearance: Normal appearance. She is obese. She is not ill-appearing or toxic-appearing.   HENT:      Head: Normocephalic and atraumatic.   Eyes:      Extraocular Movements: Extraocular movements intact.      Conjunctiva/sclera: Conjunctivae normal.      Pupils: Pupils are equal, round, and reactive to light.   Neck:      Vascular: No carotid bruit or JVD.   Cardiovascular:      Rate and Rhythm: Normal rate and regular rhythm.      Pulses:           Dorsalis pedis pulses are detected w/ Doppler on the left side.      Heart sounds: No murmur heard.  Pulmonary:      Effort: Pulmonary effort is normal. No respiratory distress.      Breath sounds: No stridor. No wheezing, rhonchi or rales.   Musculoskeletal:         General: No swelling, tenderness or deformity.      Right lower leg: 3+ Pitting Edema present.      Left lower leg: 3+ Pitting Edema present.      Comments: Massive edema of the bilateral lower extremities with hyperkeratosis with plaques and some superficial skin breakdown.  No signs of active cellulitis noted today.   Feet:      Comments: Triphasic left dorsalis pedis pulse detected with hand-held Doppler.  Other pulses undetectable secondary to hyperkeratosis plaques.  Skin:     General: Skin is warm.      Capillary Refill: Capillary refill takes less than 2 seconds.      Coloration: Skin is not ashen.      Findings: No bruising, erythema, lesion, rash or wound.   Neurological:      Mental Status: She is alert and oriented to person, place, and time.      Motor: No weakness.   Psychiatric:         Speech: Speech normal.         Behavior: Behavior normal. Behavior is cooperative.                   Reticular/Spider veins noted:  RLE: none  LLE: none    Varicose veins noted:  RLE: none  LLE:  none    CEAP Classification  Clinical  Signs: Class 6 - Leg ulceration, skin changes as defined above  Etiologic Classification: Primary  Anatomic distribution: Superficial  Pathophysiologic dysfunction: Reflux           Venous Clinical Severity Score  Pain:2=Daily, moderate activity limitation, occasional analgesics  Varicose Veins: 0=None  Venous Edema: 3=Morning edema above ankle and requiring activity change, elevation  Pigmentation: 3=Wider distribution (above lower 1/3) and recent pigmentation  Inflammation: 0=None  Induration: 3=Entire lower third of leg or more  Number of Active Ulcers: 2=2  Active Ulceration, Duration: 2=>3 months, <1 year  Active Ulcer Size: 1=<2 cm diameter  Compressive Therapy: 3=Full compliance, stockings + elevation  Total Score: 19       III. ASSESSMENT & PLAN (MEDICAL DECISION MAKING)     1. Venous insufficiency    2. Hyperkeratosis    3. Edema, lower extremity        Assessment/Diagnosis and Plan:    - continue with pink unna boot and Coban 2 Lite wraps with twice weekly wrap changes by home health.  I have written new orders today.  - Therapeutic leg elevation  - Calf pumping exercises  - Follow-up in 3 months for 9-month post ablation follow-up and wound/wrap check.    Orders Placed This Encounter    cephALEXin (KEFLEX) 500 MG capsule        Shabbir León,

## 2025-03-06 ENCOUNTER — TELEPHONE (OUTPATIENT)
Dept: VASCULAR SURGERY | Facility: CLINIC | Age: 80
End: 2025-03-06
Payer: COMMERCIAL

## 2025-04-14 ENCOUNTER — OFFICE VISIT (OUTPATIENT)
Dept: VASCULAR SURGERY | Facility: CLINIC | Age: 80
End: 2025-04-14
Payer: COMMERCIAL

## 2025-04-14 VITALS
DIASTOLIC BLOOD PRESSURE: 58 MMHG | BODY MASS INDEX: 36.32 KG/M2 | RESPIRATION RATE: 20 BRPM | WEIGHT: 205 LBS | HEIGHT: 63 IN | HEART RATE: 76 BPM | SYSTOLIC BLOOD PRESSURE: 150 MMHG

## 2025-04-14 DIAGNOSIS — L81.9 HYPERPIGMENTATION OF SKIN: ICD-10-CM

## 2025-04-14 DIAGNOSIS — L85.9 HYPERKERATOSIS: ICD-10-CM

## 2025-04-14 DIAGNOSIS — R60.0 EDEMA, LOWER EXTREMITY: ICD-10-CM

## 2025-04-14 DIAGNOSIS — I87.2 VENOUS INSUFFICIENCY: Primary | ICD-10-CM

## 2025-04-14 PROCEDURE — 3077F SYST BP >= 140 MM HG: CPT | Mod: CPTII,,, | Performed by: FAMILY MEDICINE

## 2025-04-14 PROCEDURE — 99214 OFFICE O/P EST MOD 30 MIN: CPT | Mod: S$PBB,,, | Performed by: FAMILY MEDICINE

## 2025-04-14 PROCEDURE — 3078F DIAST BP <80 MM HG: CPT | Mod: CPTII,,, | Performed by: FAMILY MEDICINE

## 2025-04-14 PROCEDURE — 1101F PT FALLS ASSESS-DOCD LE1/YR: CPT | Mod: CPTII,,, | Performed by: FAMILY MEDICINE

## 2025-04-14 PROCEDURE — 99999 PR PBB SHADOW E&M-EST. PATIENT-LVL V: CPT | Mod: PBBFAC,,, | Performed by: FAMILY MEDICINE

## 2025-04-14 PROCEDURE — 1160F RVW MEDS BY RX/DR IN RCRD: CPT | Mod: CPTII,,, | Performed by: FAMILY MEDICINE

## 2025-04-14 PROCEDURE — 3288F FALL RISK ASSESSMENT DOCD: CPT | Mod: CPTII,,, | Performed by: FAMILY MEDICINE

## 2025-04-14 PROCEDURE — 1125F AMNT PAIN NOTED PAIN PRSNT: CPT | Mod: CPTII,,, | Performed by: FAMILY MEDICINE

## 2025-04-14 PROCEDURE — 99215 OFFICE O/P EST HI 40 MIN: CPT | Mod: PBBFAC | Performed by: FAMILY MEDICINE

## 2025-04-14 PROCEDURE — 1159F MED LIST DOCD IN RCRD: CPT | Mod: CPTII,,, | Performed by: FAMILY MEDICINE

## 2025-04-14 RX ORDER — NYSTATIN 100000 U/G
CREAM TOPICAL
COMMUNITY
Start: 2025-01-31

## 2025-04-14 RX ORDER — OLOPATADINE HYDROCHLORIDE 2 MG/ML
1 SOLUTION OPHTHALMIC
COMMUNITY
Start: 2024-12-17

## 2025-04-14 RX ORDER — OXYCODONE AND ACETAMINOPHEN 10; 325 MG/1; MG/1
TABLET ORAL
COMMUNITY
Start: 2025-04-04

## 2025-04-14 NOTE — PROGRESS NOTES
VEIN CENTER CLINIC NOTE    Patient ID: Ольга Hutchinson is a 79 y.o. female.    I. HISTORY     Chief Complaint:   Chief Complaint   Patient presents with    Follow-up     9M PA, BAILEY GSV VENASEAL AB 05/2024        HPI: Ольга Hutchisnon is a 79 y.o. female who presents for a 9 month follow-up after undergoing a bilateral great saphenous veins non thermal adhesive ablations and a three-month follow-up for pink unna boot and compression wrap check.  The patient states that she has continued to have lower extremity edema, hyperpigmentation and hyperkeratosis bilaterally.  Since last visit the patient has had home health coming out twice weekly for pink unna boot Coban 2 compression wrap changes.  The patient's legs are improved, however she continues to have dark brawny edema and hyperkeratosis bilaterally.  She would benefit from continued appropriate skin conditioning and compression wraps.      Clinical summary:  Bilateral complete venous reflux study, 02/13/2024, shows no evidence of DVT bilaterally, however study limited to body habitus.  The study also shows dilation and axial reflux of the bilateral great saphenous veins.  No reflux noted in the bilateral small saphenous veins.    Patient was initially seen on 02/06/2024 by referral from [facility] for evaluation of bilateral lower extremity edema, hyperpigmentation, hyperkeratosis and skin breakdown..  Symptoms are progressive/worsening and began greater than 20 years ago.  Location is bilateral lower extremities below the knees. Symptoms are worse at the end of the day.  History of venous interventions includes none.  Denies family history of venous disease.  Denies history of DVT.  Admits to history of cellulitis.    Venous Disease Medical Necessity Documentation Initial Visit Date:  02/06/2024 Return Check Date:    Have you ever had a rupture or bleed from a varicose vein in your leg(s)?              [] Yes  [x] No   [] Yes   [] No   Have you ever been diagnosed  with phlebitis, cellulitis, or inflammation in the area of the varicose veins of  your leg(s)?  [] Yes  [x] No    [] Yes   [] No   Do you have darkened or inflamed skin on your legs?   [x] Yes   [] No   [] Yes   [] No   Do you have leg swelling?     [x] Yes   [] No   [] Yes   [] No   Do you have leg pain?   [x] Yes   [] No   [] Yes   [] No   If yes, describe the type of pain?    []   Stabbing []  Radiating [x]  Aching   []  Tightness []  Throbbing               []  Burning []  Cramping              Do you have leg discomfort?   [x] Yes   [] No   [] Yes   [] No   If yes, describe the type of discomfort?    [x]  Heaviness [x]  Fullness   []  Restlessness [] Tired/Fatigued [] Itching              Have you ever worn compression hose?   [] Yes   [x] No   [] Yes   [] No   If yes, how long?           Do you elevate your legs while sitting?   [x] Yes   [] No   [] Yes   [] No   Does venous disease (varicose veins, ulcers, skin changes, leg pain/swelling) interfere with your daily life?  If yes, check activities you are limited or unable to do.    []  Shower  []   Walk  []  Exercise  [] Play with children/grandchildren  []  Shop [] Work [] Stand for any period of time [x] Sleep                               [] Sitting for an extended period of time.           [x] Yes   [] No   [] Yes   [] No   Do you exercise/have you tried to exercise (i.e.  Walk our participate in a regular exercise routine)?  [] Yes  [x] No   [] Yes   [] No   BMI 36.67             History reviewed. No pertinent past medical history.     Past Surgical History:   Procedure Laterality Date    ABLATION, CHEMICAL SEALANT, VARICOSE VEIN Right 05/23/2024    Right GSV Venaseal Ablation performed by Dr. Corbin León    ABLATION, CHEMICAL SEALANT, VARICOSE VEIN Left 05/30/2024    Left GSV Venaseal Ablation performed by Dr. Corbin León       Social History     Tobacco Use   Smoking Status Never   Smokeless Tobacco Never         Current Outpatient Medications:      albuterol (PROVENTIL) 2.5 mg /3 mL (0.083 %) nebulizer solution, 3 MILLILITER VIAL NEBULIZED AS NEEDED EVERY 6 HOURS AS NEEDED WHEEZING INHALATION 90 DAYS, Disp: , Rfl:     azelastine (ASTELIN) 137 mcg (0.1 %) nasal spray, 1 PUFF IN EACH NOSTRIL NASALLY TWICE A DAY 90 DAYS, Disp: , Rfl:     baclofen (LIORESAL) 10 MG tablet, Take 10 mg by mouth 3 (three) times daily., Disp: , Rfl:     cephALEXin (KEFLEX) 500 MG capsule, Take 1 capsule (500 mg total) by mouth every 8 (eight) hours., Disp: 21 capsule, Rfl: 0    HYDROcodone-acetaminophen (NORCO)  mg per tablet, TAKE 1 ORAL TABLET 3 TIMES A DAY AS NEEDED FOR PAIN DO NOT DRIVE WHILE TAKING THIS MEDICATION TO BE FILLED ON OR AFTER 11-4-2023, Disp: , Rfl:     lactulose (CHRONULAC) 10 gram/15 mL solution, TAKE 10 ML ORALLY EVERY DAY AS NEEDED 90 DAYS, Disp: , Rfl:     loratadine (CLARITIN) 10 mg tablet, Take 10 mg by mouth once daily., Disp: , Rfl:     mupirocin (BACTROBAN) 2 % ointment, Apply topically 3 (three) times daily., Disp: , Rfl:     nystatin (MYCOSTATIN) cream, USE 1 APPLICATION EXTERNALLY TWICE A DAY UNTIL SKIN HAS CLEARED, Disp: , Rfl:     ondansetron (ZOFRAN-ODT) 4 MG TbDL, Take 4 mg by mouth once., Disp: , Rfl:     oxyCODONE-acetaminophen (PERCOCET)  mg per tablet, TAKE 1 ORAL TABLET 3-4 TIMES A DAY AS NEEDED FOR PAIN DO NOT DRIVE WHILE TAKING THIS MEDICATION TO BE FILLED ON OR AFTER 4-3-2025, Disp: , Rfl:     pantoprazole (PROTONIX) 40 MG tablet, 1 TABLET FOR REFLUX ORALLY ONCE A DAY 90 DAYS, Disp: , Rfl:     PATADAY ONCE DAILY RELIEF 0.2 % Drop, Place 1 drop into both eyes., Disp: , Rfl:     potassium chloride SA (K-DUR,KLOR-CON M) 10 MEQ tablet, 1 TABLET WITH FOOD ORAL ONCE A DAY 90 DAYS, Disp: , Rfl:     senna-docusate 8.6-50 mg (PERICOLACE) 8.6-50 mg per tablet, Take 1 tablet by mouth once daily., Disp: , Rfl:     triamcinolone acetonide 0.1% (KENALOG) 0.1 % cream, Apply topically 2 (two) times daily., Disp: , Rfl:     zinc gluconate 50 mg  tablet, Take 50 mg by mouth once daily., Disp: , Rfl:     Review of Systems   Constitutional:  Negative for activity change, chills, diaphoresis, fatigue and fever.   Respiratory:  Negative for cough and shortness of breath.    Cardiovascular:  Positive for leg swelling. Negative for chest pain and claudication.        Hyperpigmentation LE   Gastrointestinal:  Negative for nausea and vomiting.   Musculoskeletal:  Positive for leg pain. Negative for joint swelling.   Integumentary:  Negative for rash and wound.   Neurological:  Negative for weakness and numbness.        II. PHYSICAL EXAM     Physical Exam  Constitutional:       General: She is awake. She is not in acute distress.     Appearance: Normal appearance. She is obese. She is not ill-appearing or toxic-appearing.   HENT:      Head: Normocephalic and atraumatic.   Eyes:      Extraocular Movements: Extraocular movements intact.      Conjunctiva/sclera: Conjunctivae normal.      Pupils: Pupils are equal, round, and reactive to light.   Neck:      Vascular: No carotid bruit or JVD.   Cardiovascular:      Rate and Rhythm: Normal rate and regular rhythm.      Pulses:           Dorsalis pedis pulses are detected w/ Doppler on the left side.      Heart sounds: No murmur heard.  Pulmonary:      Effort: Pulmonary effort is normal. No respiratory distress.      Breath sounds: No stridor. No wheezing, rhonchi or rales.   Musculoskeletal:         General: No swelling, tenderness or deformity.      Right lower leg: 3+ Pitting Edema present.      Left lower leg: 3+ Pitting Edema present.      Comments: Massive edema of the bilateral lower extremities with hyperkeratosis with plaques and some superficial skin breakdown.  No signs of active cellulitis noted today.   Feet:      Comments: Triphasic left dorsalis pedis pulse detected with hand-held Doppler.  Other pulses undetectable secondary to hyperkeratosis plaques.  Skin:     General: Skin is warm.      Capillary Refill:  Capillary refill takes less than 2 seconds.      Coloration: Skin is not ashen.      Findings: No bruising, erythema, lesion, rash or wound.   Neurological:      Mental Status: She is alert and oriented to person, place, and time.      Motor: No weakness.   Psychiatric:         Speech: Speech normal.         Behavior: Behavior normal. Behavior is cooperative.                     Reticular/Spider veins noted:  RLE: none  LLE: none    Varicose veins noted:  RLE: none  LLE:  none    CEAP Classification  Clinical Signs: Class 6 - Leg ulceration, skin changes as defined above  Etiologic Classification: Primary  Anatomic distribution: Superficial  Pathophysiologic dysfunction: Reflux           Venous Clinical Severity Score  Pain:2=Daily, moderate activity limitation, occasional analgesics  Varicose Veins: 0=None  Venous Edema: 3=Morning edema above ankle and requiring activity change, elevation  Pigmentation: 3=Wider distribution (above lower 1/3) and recent pigmentation  Inflammation: 0=None  Induration: 3=Entire lower third of leg or more  Number of Active Ulcers: 2=2  Active Ulceration, Duration: 2=>3 months, <1 year  Active Ulcer Size: 1=<2 cm diameter  Compressive Therapy: 3=Full compliance, stockings + elevation  Total Score: 19       III. ASSESSMENT & PLAN (MEDICAL DECISION MAKING)     1. Venous insufficiency    2. Hyperkeratosis    3. Edema, lower extremity    4. Hyperpigmentation of skin        Assessment/Diagnosis and Plan:    - continue with pink unna boot and Coban 2 Lite wraps with twice weekly wrap changes by home health.  I have written new orders today.  - Therapeutic leg elevation  - Calf pumping exercises  - Follow-up in 3 months for 12-month post ablation follow-up and wound/wrap check.            Shabbir León DO          Date:2025  Patient Name:Ольга Hutchinson  :1945  Allergies:Sulfamethoxazole-trimethoprim and Valdecoxib    Home Health Agency: [facility]    Type of Orders: Home  Health    Diagnosis: Wound Care    Frequency: 2 x weekly    Days:  Twice weekly    Duration: 0 Wk(s) 3 Mo(s)     ------------------------------------------------------------------------------------------------------------------------------------------------------------------------------------------------------------------------------------------------------------------    PT Goals: Improve calf pump function, increase ankle ROM, improve LE strength, balance, and gait.     Wound Care:     [x] Remove existing wraps/ dressing and inspect for signs of infection, wash leg and wound with mild soap (i.e. baby shampoo).     [] ALVIN: Inspect ALVIN negative pressure dressing and observe for signs of infections.   If saturated: remove dressing, wash wound with mild soap (baby shampoo), spray/wipe charli-wound area with skin prep and replace with new dressing.   If NOT saturated: leave in place     [] Patient has a skin substitute graft in place and will not require washing.     [] TRADITIONAL WOUND VAC: Remove standard negative pressure dressing and note any signs of infection, wash wound and leg with mild soap (baby shampoo).   Spray/wipe charli-wound area with skin prep, apply Eakins ring to charli-wound area, re-apply standard negative pressure dressing.     [] Re-apply dressing consisting of: TO ULCERATED AREAS      [] Collagen with silver  [] Plain Collagen [] Alginate bolster  [] Foam Pad     [] Aqua Seal  [] Silvadene Topical  [] Santyl   [] Island Dressing     []Other:     [x] Apply Compression Wrap: []  Left [] Right [x] Bilateral     [x] 3M Coban 2 Lite (20-30 mmHg) (Green)   [] 3M Coban 2 (30-40 mmHg) (Purple)     [x]Pink UNNA Boot (with one choice above)   [] White UNNA boot (with one choice above)     [] Compression Stockings  []Other:     [] Skincare:     [] Calmoseptine  [] Eucerin/Aquaphor Cream  [] Triamcinolone Cream          Misc:       Shabbir León      Any questions regarding orders, please call [phone]      [facility] ? [address] ? [place] [address] ? [phone] ? Fax: [phone]

## 2025-05-10 PROCEDURE — G0179 MD RECERTIFICATION HHA PT: HCPCS | Mod: ,,, | Performed by: FAMILY MEDICINE

## 2025-05-14 ENCOUNTER — OUTSIDE PLACE OF SERVICE (OUTPATIENT)
Dept: VASCULAR SURGERY | Facility: CLINIC | Age: 80
End: 2025-05-14
Payer: COMMERCIAL

## 2025-05-14 DIAGNOSIS — I87.2 VENOUS INSUFFICIENCY: Primary | ICD-10-CM

## 2025-05-14 DIAGNOSIS — R60.0 EDEMA, LOWER EXTREMITY: ICD-10-CM

## 2025-07-09 ENCOUNTER — OUTSIDE PLACE OF SERVICE (OUTPATIENT)
Dept: VASCULAR SURGERY | Facility: CLINIC | Age: 80
End: 2025-07-09
Payer: COMMERCIAL

## 2025-07-09 DIAGNOSIS — R60.0 EDEMA, LOWER EXTREMITY: Primary | ICD-10-CM

## 2025-07-14 ENCOUNTER — OFFICE VISIT (OUTPATIENT)
Dept: VASCULAR SURGERY | Facility: CLINIC | Age: 80
End: 2025-07-14
Payer: COMMERCIAL

## 2025-07-14 ENCOUNTER — TELEPHONE (OUTPATIENT)
Dept: VASCULAR SURGERY | Facility: CLINIC | Age: 80
End: 2025-07-14
Payer: COMMERCIAL

## 2025-07-14 VITALS — DIASTOLIC BLOOD PRESSURE: 75 MMHG | SYSTOLIC BLOOD PRESSURE: 119 MMHG | HEART RATE: 74 BPM

## 2025-07-14 DIAGNOSIS — I87.2 VENOUS INSUFFICIENCY: Primary | ICD-10-CM

## 2025-07-14 DIAGNOSIS — M79.604 LEG PAIN, BILATERAL: ICD-10-CM

## 2025-07-14 DIAGNOSIS — L81.9 HYPERPIGMENTATION OF SKIN: ICD-10-CM

## 2025-07-14 DIAGNOSIS — R60.0 EDEMA, LOWER EXTREMITY: ICD-10-CM

## 2025-07-14 DIAGNOSIS — M79.605 LEG PAIN, BILATERAL: ICD-10-CM

## 2025-07-14 PROCEDURE — 3078F DIAST BP <80 MM HG: CPT | Mod: CPTII,,, | Performed by: FAMILY MEDICINE

## 2025-07-14 PROCEDURE — 99214 OFFICE O/P EST MOD 30 MIN: CPT | Mod: PBBFAC | Performed by: FAMILY MEDICINE

## 2025-07-14 PROCEDURE — 1159F MED LIST DOCD IN RCRD: CPT | Mod: CPTII,,, | Performed by: FAMILY MEDICINE

## 2025-07-14 PROCEDURE — 99214 OFFICE O/P EST MOD 30 MIN: CPT | Mod: S$PBB,,, | Performed by: FAMILY MEDICINE

## 2025-07-14 PROCEDURE — 3074F SYST BP LT 130 MM HG: CPT | Mod: CPTII,,, | Performed by: FAMILY MEDICINE

## 2025-07-14 PROCEDURE — 99999 PR PBB SHADOW E&M-EST. PATIENT-LVL IV: CPT | Mod: PBBFAC,,, | Performed by: FAMILY MEDICINE

## 2025-07-14 PROCEDURE — 1160F RVW MEDS BY RX/DR IN RCRD: CPT | Mod: CPTII,,, | Performed by: FAMILY MEDICINE

## 2025-07-14 NOTE — TELEPHONE ENCOUNTER
Copied from CRM #[id number]. Topic: General Inquiry - Patient Advice  >> Jul 14, 2025  3:17 PM Junie wrote:  Who Called: Shraddha @ [facility]        Shraddha is requesting to speak w nurse about pt     Preferred Method of Contact: Phone Call  Patient's Preferred Phone Number on File: [phone]   Best Call Back Number, if different:  Additional Information:

## 2025-07-14 NOTE — PROGRESS NOTES
VEIN CENTER CLINIC NOTE    Patient ID: Ольга Hutchinson is a 79 y.o. female.    I. HISTORY     Chief Complaint:   Chief Complaint   Patient presents with    Follow-up     Procedure Room 1  1y PA        HPI: Ольга Hutchinson is a 79 y.o. female who presents for a 9 month follow-up after undergoing a bilateral great saphenous veins non thermal adhesive ablations and a three-month follow-up for pink unna boot and compression wrap check.  The patient states that she has continued to have lower extremity edema, hyperpigmentation and hyperkeratosis bilaterally.  Since last visit the patient has had home health coming out twice weekly for pink unna boot Coban 2 compression wrap changes.  The patient's legs are improved, however she continues to have dark brawny edema and hyperkeratosis bilaterally.  She would benefit from continued appropriate skin conditioning and compression wraps.      Clinical summary:  Bilateral complete venous reflux study, 02/13/2024, shows no evidence of DVT bilaterally, however study limited to body habitus.  The study also shows dilation and axial reflux of the bilateral great saphenous veins.  No reflux noted in the bilateral small saphenous veins.    Patient was initially seen on 02/06/2024 by referral from [facility] for evaluation of bilateral lower extremity edema, hyperpigmentation, hyperkeratosis and skin breakdown..  Symptoms are progressive/worsening and began greater than 20 years ago.  Location is bilateral lower extremities below the knees. Symptoms are worse at the end of the day.  History of venous interventions includes none.  Denies family history of venous disease.  Denies history of DVT.  Admits to history of cellulitis.    Venous Disease Medical Necessity Documentation Initial Visit Date:  02/06/2024 Return Check Date:    Have you ever had a rupture or bleed from a varicose vein in your leg(s)?              [] Yes  [x] No   [] Yes   [] No   Have you ever been diagnosed with  phlebitis, cellulitis, or inflammation in the area of the varicose veins of  your leg(s)?  [] Yes  [x] No    [] Yes   [] No   Do you have darkened or inflamed skin on your legs?   [x] Yes   [] No   [] Yes   [] No   Do you have leg swelling?     [x] Yes   [] No   [] Yes   [] No   Do you have leg pain?   [x] Yes   [] No   [] Yes   [] No   If yes, describe the type of pain?    []   Stabbing []  Radiating [x]  Aching   []  Tightness []  Throbbing               []  Burning []  Cramping              Do you have leg discomfort?   [x] Yes   [] No   [] Yes   [] No   If yes, describe the type of discomfort?    [x]  Heaviness [x]  Fullness   []  Restlessness [] Tired/Fatigued [] Itching              Have you ever worn compression hose?   [] Yes   [x] No   [] Yes   [] No   If yes, how long?           Do you elevate your legs while sitting?   [x] Yes   [] No   [] Yes   [] No   Does venous disease (varicose veins, ulcers, skin changes, leg pain/swelling) interfere with your daily life?  If yes, check activities you are limited or unable to do.    []  Shower  []   Walk  []  Exercise  [] Play with children/grandchildren  []  Shop [] Work [] Stand for any period of time [x] Sleep                               [] Sitting for an extended period of time.           [x] Yes   [] No   [] Yes   [] No   Do you exercise/have you tried to exercise (i.e.  Walk our participate in a regular exercise routine)?  [] Yes  [x] No   [] Yes   [] No   BMI 36.67             History reviewed. No pertinent past medical history.     Past Surgical History:   Procedure Laterality Date    ABLATION, CHEMICAL SEALANT, VARICOSE VEIN Right 05/23/2024    Right GSV Venaseal Ablation performed by Dr. Corbin León    ABLATION, CHEMICAL SEALANT, VARICOSE VEIN Left 05/30/2024    Left GSV Venaseal Ablation performed by Dr. Corbin León       Social History     Tobacco Use   Smoking Status Never   Smokeless Tobacco Never         Current Outpatient Medications:      albuterol (PROVENTIL) 2.5 mg /3 mL (0.083 %) nebulizer solution, 3 MILLILITER VIAL NEBULIZED AS NEEDED EVERY 6 HOURS AS NEEDED WHEEZING INHALATION 90 DAYS, Disp: , Rfl:     azelastine (ASTELIN) 137 mcg (0.1 %) nasal spray, 1 PUFF IN EACH NOSTRIL NASALLY TWICE A DAY 90 DAYS, Disp: , Rfl:     baclofen (LIORESAL) 10 MG tablet, Take 10 mg by mouth 3 (three) times daily., Disp: , Rfl:     cephALEXin (KEFLEX) 500 MG capsule, Take 1 capsule (500 mg total) by mouth every 8 (eight) hours., Disp: 21 capsule, Rfl: 0    HYDROcodone-acetaminophen (NORCO)  mg per tablet, TAKE 1 ORAL TABLET 3 TIMES A DAY AS NEEDED FOR PAIN DO NOT DRIVE WHILE TAKING THIS MEDICATION TO BE FILLED ON OR AFTER 11-4-2023, Disp: , Rfl:     lactulose (CHRONULAC) 10 gram/15 mL solution, TAKE 10 ML ORALLY EVERY DAY AS NEEDED 90 DAYS, Disp: , Rfl:     loratadine (CLARITIN) 10 mg tablet, Take 10 mg by mouth once daily., Disp: , Rfl:     mupirocin (BACTROBAN) 2 % ointment, Apply topically 3 (three) times daily., Disp: , Rfl:     nystatin (MYCOSTATIN) cream, USE 1 APPLICATION EXTERNALLY TWICE A DAY UNTIL SKIN HAS CLEARED, Disp: , Rfl:     ondansetron (ZOFRAN-ODT) 4 MG TbDL, Take 4 mg by mouth once., Disp: , Rfl:     oxyCODONE-acetaminophen (PERCOCET)  mg per tablet, TAKE 1 ORAL TABLET 3-4 TIMES A DAY AS NEEDED FOR PAIN DO NOT DRIVE WHILE TAKING THIS MEDICATION TO BE FILLED ON OR AFTER 4-3-2025, Disp: , Rfl:     pantoprazole (PROTONIX) 40 MG tablet, 1 TABLET FOR REFLUX ORALLY ONCE A DAY 90 DAYS, Disp: , Rfl:     PATADAY ONCE DAILY RELIEF 0.2 % Drop, Place 1 drop into both eyes., Disp: , Rfl:     potassium chloride SA (K-DUR,KLOR-CON M) 10 MEQ tablet, 1 TABLET WITH FOOD ORAL ONCE A DAY 90 DAYS, Disp: , Rfl:     senna-docusate 8.6-50 mg (PERICOLACE) 8.6-50 mg per tablet, Take 1 tablet by mouth once daily., Disp: , Rfl:     triamcinolone acetonide 0.1% (KENALOG) 0.1 % cream, Apply topically 2 (two) times daily., Disp: , Rfl:     zinc gluconate 50 mg  tablet, Take 50 mg by mouth once daily., Disp: , Rfl:     Review of Systems   Constitutional:  Negative for activity change, chills, diaphoresis, fatigue and fever.   Respiratory:  Negative for cough and shortness of breath.    Cardiovascular:  Positive for leg swelling. Negative for chest pain and claudication.        Hyperpigmentation LE   Gastrointestinal:  Negative for nausea and vomiting.   Musculoskeletal:  Positive for leg pain. Negative for joint swelling.   Integumentary:  Negative for rash and wound.   Neurological:  Negative for weakness and numbness.        II. PHYSICAL EXAM     Physical Exam  Constitutional:       General: She is awake. She is not in acute distress.     Appearance: Normal appearance. She is obese. She is not ill-appearing or toxic-appearing.   HENT:      Head: Normocephalic and atraumatic.   Eyes:      Extraocular Movements: Extraocular movements intact.      Conjunctiva/sclera: Conjunctivae normal.      Pupils: Pupils are equal, round, and reactive to light.   Neck:      Vascular: No carotid bruit or JVD.   Cardiovascular:      Rate and Rhythm: Normal rate and regular rhythm.      Pulses:           Dorsalis pedis pulses are detected w/ Doppler on the left side.      Heart sounds: No murmur heard.  Pulmonary:      Effort: Pulmonary effort is normal. No respiratory distress.      Breath sounds: No stridor. No wheezing, rhonchi or rales.   Musculoskeletal:         General: No swelling, tenderness or deformity.      Right lower leg: 3+ Pitting Edema present.      Left lower leg: 3+ Pitting Edema present.      Comments: Massive edema of the bilateral lower extremities with hyperkeratosis with plaques and some superficial skin breakdown.  No signs of active cellulitis noted today.   Feet:      Comments: Triphasic left dorsalis pedis pulse detected with hand-held Doppler.  Other pulses undetectable secondary to hyperkeratosis plaques.  Skin:     General: Skin is warm.      Capillary Refill:  Capillary refill takes less than 2 seconds.      Coloration: Skin is not ashen.      Findings: No bruising, erythema, lesion, rash or wound.   Neurological:      Mental Status: She is alert and oriented to person, place, and time.      Motor: No weakness.   Psychiatric:         Speech: Speech normal.         Behavior: Behavior normal. Behavior is cooperative.                     Reticular/Spider veins noted:  RLE: none  LLE: none    Varicose veins noted:  RLE: none  LLE:  none    CEAP Classification  Clinical Signs: Class 6 - Leg ulceration, skin changes as defined above  Etiologic Classification: Primary  Anatomic distribution: Superficial  Pathophysiologic dysfunction: Reflux           Venous Clinical Severity Score  Pain:2=Daily, moderate activity limitation, occasional analgesics  Varicose Veins: 0=None  Venous Edema: 3=Morning edema above ankle and requiring activity change, elevation  Pigmentation: 3=Wider distribution (above lower 1/3) and recent pigmentation  Inflammation: 0=None  Induration: 3=Entire lower third of leg or more  Number of Active Ulcers: 2=2  Active Ulceration, Duration: 2=>3 months, <1 year  Active Ulcer Size: 1=<2 cm diameter  Compressive Therapy: 3=Full compliance, stockings + elevation  Total Score: 19       III. ASSESSMENT & PLAN (MEDICAL DECISION MAKING)     1. Venous insufficiency    2. Edema, lower extremity    3. Hyperpigmentation of skin    4. Leg pain, bilateral        Assessment & Plan    I87.2 Venous insufficiency  R60.0 Edema, lower extremity  L81.9 Hyperpigmentation of skin  M79.604, M79.605 Leg pain, bilateral    VENOUS INSUFFICIENCY:  - Assessed progress one year post-vein procedures and 3 months after initiating compression therapy.  - Current compression wrap treatment has improved leg appearance and eliminated wounds.  - Prescribed velcro compression devices as long-term solution for continued compression therapy.  - Recommend continued use of Faustina-Boot until transition to  new compression devices.  - Discussed proper skin care routine with new compression devices, including use of stockinette and moisturizing creams.  - Use stockinette under new velcro compression wraps to protect skin.  - Ordered measurement and prescription for velcro compression devices.  - Follow up in 3 months to assess progress with new compression therapy.    EDEMA, LOWER EXTREMITY:  - Current compression wrap treatment has improved leg appearance and eliminated wounds.  - Prescribed velcro compression devices as long-term solution for continued compression therapy.  - Recommend continued use of Faustina-Boot until transition to new compression devices.  - Ordered measurement and prescription for velcro compression devices.  - Follow up in 3 months to assess progress with new compression therapy.    HYPERPIGMENTATION OF SKIN:  - Apply Calmoseptine or Eucerin cream to condition skin and help reduce dark discoloration.  - Started Calmoseptine and Eucerin cream, to be applied to legs before compression therapy.  - Discussed proper skin care routine with new compression devices, including use of stockinette and moisturizing creams.            Shabbir León,     This note was generated with the assistance of ambient listening technology. Verbal consent was obtained by the patient and accompanying visitor(s) for the recording of patient appointment to facilitate this note. I attest to having reviewed and edited the generated note for accuracy, though some syntax or spelling errors may persist. Please contact the author of this note for any clarification.

## 2025-07-30 ENCOUNTER — TELEPHONE (OUTPATIENT)
Dept: VASCULAR SURGERY | Facility: CLINIC | Age: 80
End: 2025-07-30
Payer: COMMERCIAL

## 2025-07-30 NOTE — TELEPHONE ENCOUNTER
Copied from CRM #[id number]. Topic: General Inquiry - Patient Advice  >> Jul 30, 2025  1:10 PM Loly wrote:  Elissa with [facility] called in to request the last visit notes of pt VASYL Hutchinson. She initially sent over the request on 07/24 and she has been waiting a week to receive the notes. She is asking if the notes could be faxed over to [phone]. Call back number is [phone].

## 2025-08-07 ENCOUNTER — TELEPHONE (OUTPATIENT)
Dept: VASCULAR SURGERY | Facility: CLINIC | Age: 80
End: 2025-08-07
Payer: COMMERCIAL

## 2025-08-07 NOTE — TELEPHONE ENCOUNTER
Copied from CRM #[id number]. Topic: General Inquiry - Patient Advice  >> Aug 7, 2025  9:45 AM Tay wrote:  Who Called: [facility]     Caller is requesting assistance/information from provider's office.     [facility] would like a call from the nurse please     [phone]